# Patient Record
Sex: FEMALE | Race: OTHER | HISPANIC OR LATINO
[De-identification: names, ages, dates, MRNs, and addresses within clinical notes are randomized per-mention and may not be internally consistent; named-entity substitution may affect disease eponyms.]

---

## 2017-12-03 ENCOUNTER — TRANSCRIPTION ENCOUNTER (OUTPATIENT)
Age: 22
End: 2017-12-03

## 2018-03-25 ENCOUNTER — EMERGENCY (EMERGENCY)
Facility: HOSPITAL | Age: 23
LOS: 0 days | Discharge: HOME | End: 2018-03-25
Attending: EMERGENCY MEDICINE | Admitting: EMERGENCY MEDICINE
Payer: COMMERCIAL

## 2018-03-25 VITALS
DIASTOLIC BLOOD PRESSURE: 66 MMHG | SYSTOLIC BLOOD PRESSURE: 129 MMHG | RESPIRATION RATE: 16 BRPM | TEMPERATURE: 98 F | OXYGEN SATURATION: 100 % | HEART RATE: 77 BPM

## 2018-03-25 VITALS — WEIGHT: 110.01 LBS

## 2018-03-25 DIAGNOSIS — F10.10 ALCOHOL ABUSE, UNCOMPLICATED: ICD-10-CM

## 2018-03-25 DIAGNOSIS — R51 HEADACHE: ICD-10-CM

## 2018-03-25 DIAGNOSIS — R11.2 NAUSEA WITH VOMITING, UNSPECIFIED: ICD-10-CM

## 2018-03-25 DIAGNOSIS — F17.210 NICOTINE DEPENDENCE, CIGARETTES, UNCOMPLICATED: ICD-10-CM

## 2018-03-25 LAB
APPEARANCE UR: CLEAR — SIGNIFICANT CHANGE UP
BACTERIA # UR AUTO: (no result)
BILIRUB UR-MCNC: NEGATIVE — SIGNIFICANT CHANGE UP
COLOR SPEC: YELLOW — SIGNIFICANT CHANGE UP
DIFF PNL FLD: (no result)
GLUCOSE UR QL: NEGATIVE MG/DL — SIGNIFICANT CHANGE UP
KETONES UR-MCNC: (no result)
LEUKOCYTE ESTERASE UR-ACNC: (no result)
NITRITE UR-MCNC: NEGATIVE — SIGNIFICANT CHANGE UP
PH UR: 6.5 — SIGNIFICANT CHANGE UP (ref 5–8)
PROT UR-MCNC: NEGATIVE MG/DL — SIGNIFICANT CHANGE UP
RBC CASTS # UR COMP ASSIST: (no result) /HPF (ref 0–4)
SP GR SPEC: 1.01 — SIGNIFICANT CHANGE UP (ref 1.01–1.02)
UROBILINOGEN FLD QL: NEGATIVE MG/DL — SIGNIFICANT CHANGE UP
WBC UR QL: SIGNIFICANT CHANGE UP /HPF (ref 0–5)

## 2018-03-25 PROCEDURE — 99284 EMERGENCY DEPT VISIT MOD MDM: CPT

## 2018-03-25 RX ORDER — SODIUM CHLORIDE 9 MG/ML
2000 INJECTION INTRAMUSCULAR; INTRAVENOUS; SUBCUTANEOUS ONCE
Qty: 0 | Refills: 0 | Status: COMPLETED | OUTPATIENT
Start: 2018-03-25 | End: 2018-03-25

## 2018-03-25 RX ORDER — FAMOTIDINE 10 MG/ML
20 INJECTION INTRAVENOUS ONCE
Qty: 0 | Refills: 0 | Status: COMPLETED | OUTPATIENT
Start: 2018-03-25 | End: 2018-03-25

## 2018-03-25 RX ORDER — ONDANSETRON 8 MG/1
8 TABLET, FILM COATED ORAL ONCE
Qty: 0 | Refills: 0 | Status: DISCONTINUED | OUTPATIENT
Start: 2018-03-25 | End: 2018-03-25

## 2018-03-25 RX ORDER — ONDANSETRON 8 MG/1
4 TABLET, FILM COATED ORAL ONCE
Qty: 0 | Refills: 0 | Status: COMPLETED | OUTPATIENT
Start: 2018-03-25 | End: 2018-03-25

## 2018-03-25 RX ADMIN — ONDANSETRON 4 MILLIGRAM(S): 8 TABLET, FILM COATED ORAL at 11:06

## 2018-03-25 RX ADMIN — FAMOTIDINE 20 MILLIGRAM(S): 10 INJECTION INTRAVENOUS at 11:08

## 2018-03-25 RX ADMIN — SODIUM CHLORIDE 2000 MILLILITER(S): 9 INJECTION INTRAMUSCULAR; INTRAVENOUS; SUBCUTANEOUS at 11:05

## 2018-03-25 NOTE — ED STATDOCS - OBJECTIVE STATEMENT
23 y/o Female with no pertinent PMHx presents to ED c/o N/V x 5 hours. +HA. No fever. Pt admits to drinking alcohol and smoking marijuana last night. Pt denies having experienced symptoms before s/p smoking, drinking. No sick contacts.

## 2018-03-25 NOTE — ED STATDOCS - PROGRESS NOTE DETAILS
22 yr. old female PMH: Denied presents to ED with nausea and vomiting for the past 5 hrs. after drinking and smoking marijuana last night. No fever or chills. Seen and examined by attending in intake. Plan: IV, IVF, Labs and Zofran. Will f/U with results and re evaluate. Jazmyne NP

## 2018-03-25 NOTE — ED STATDOCS - ATTENDING CONTRIBUTION TO CARE
I, Gerardo Tian, performed the initial face to face bedside interview with this patient regarding history of present illness, review of symptoms and relevant past medical, social and family history.  I completed an independent physical examination.  I was the initial provider who evaluated this patient. I have signed out the follow up of any pending tests (i.e. labs, radiological studies) to the ACP.  I have communicated the patient’s plan of care and disposition with the ACP.  The history, relevant review of systems, past medical and surgical history, medical decision making, and physical examination was documented by the scribe in my presence and I attest to the accuracy of the documentation.

## 2018-03-25 NOTE — ED ADULT NURSE NOTE - CHPI ED SYMPTOMS NEG
no burning urination/no fever/no chills/no hematuria/no diarrhea/no blood in stool/no dysuria/no abdominal distension

## 2018-06-27 ENCOUNTER — OUTPATIENT (OUTPATIENT)
Dept: OUTPATIENT SERVICES | Facility: HOSPITAL | Age: 23
LOS: 1 days | Discharge: HOME | End: 2018-06-27

## 2018-06-27 VITALS
HEIGHT: 63 IN | WEIGHT: 106.92 LBS | TEMPERATURE: 98 F | RESPIRATION RATE: 16 BRPM | HEART RATE: 65 BPM | OXYGEN SATURATION: 100 % | DIASTOLIC BLOOD PRESSURE: 64 MMHG | SYSTOLIC BLOOD PRESSURE: 104 MMHG

## 2018-06-27 DIAGNOSIS — Z98.890 OTHER SPECIFIED POSTPROCEDURAL STATES: Chronic | ICD-10-CM

## 2018-06-27 DIAGNOSIS — D30.8: ICD-10-CM

## 2018-06-27 LAB
ABO RH CONFIRMATION: SIGNIFICANT CHANGE UP
ANION GAP SERPL CALC-SCNC: 5 MMOL/L — SIGNIFICANT CHANGE UP (ref 5–17)
APPEARANCE UR: CLEAR — SIGNIFICANT CHANGE UP
BACTERIA # UR AUTO: ABNORMAL
BASOPHILS # BLD AUTO: 0.02 K/UL — SIGNIFICANT CHANGE UP (ref 0–0.2)
BASOPHILS NFR BLD AUTO: 0.3 % — SIGNIFICANT CHANGE UP (ref 0–2)
BILIRUB UR-MCNC: NEGATIVE — SIGNIFICANT CHANGE UP
BLD GP AB SCN SERPL QL: SIGNIFICANT CHANGE UP
BUN SERPL-MCNC: 7 MG/DL — SIGNIFICANT CHANGE UP (ref 7–23)
CALCIUM SERPL-MCNC: 8.7 MG/DL — SIGNIFICANT CHANGE UP (ref 8.5–10.1)
CHLORIDE SERPL-SCNC: 109 MMOL/L — HIGH (ref 96–108)
CO2 SERPL-SCNC: 28 MMOL/L — SIGNIFICANT CHANGE UP (ref 22–31)
COLOR SPEC: YELLOW — SIGNIFICANT CHANGE UP
CREAT SERPL-MCNC: 0.72 MG/DL — SIGNIFICANT CHANGE UP (ref 0.5–1.3)
DIFF PNL FLD: ABNORMAL
EOSINOPHIL # BLD AUTO: 0.21 K/UL — SIGNIFICANT CHANGE UP (ref 0–0.5)
EOSINOPHIL NFR BLD AUTO: 3.6 % — SIGNIFICANT CHANGE UP (ref 0–6)
EPI CELLS # UR: SIGNIFICANT CHANGE UP
GLUCOSE SERPL-MCNC: 72 MG/DL — SIGNIFICANT CHANGE UP (ref 70–99)
GLUCOSE UR QL: NEGATIVE MG/DL — SIGNIFICANT CHANGE UP
HCT VFR BLD CALC: 35.2 % — SIGNIFICANT CHANGE UP (ref 34.5–45)
HGB BLD-MCNC: 12.2 G/DL — SIGNIFICANT CHANGE UP (ref 11.5–15.5)
IMM GRANULOCYTES NFR BLD AUTO: 0.2 % — SIGNIFICANT CHANGE UP (ref 0–1.5)
KETONES UR-MCNC: NEGATIVE — SIGNIFICANT CHANGE UP
LEUKOCYTE ESTERASE UR-ACNC: ABNORMAL
LYMPHOCYTES # BLD AUTO: 2.25 K/UL — SIGNIFICANT CHANGE UP (ref 1–3.3)
LYMPHOCYTES # BLD AUTO: 38.3 % — SIGNIFICANT CHANGE UP (ref 13–44)
MCHC RBC-ENTMCNC: 30.1 PG — SIGNIFICANT CHANGE UP (ref 27–34)
MCHC RBC-ENTMCNC: 34.7 GM/DL — SIGNIFICANT CHANGE UP (ref 32–36)
MCV RBC AUTO: 86.9 FL — SIGNIFICANT CHANGE UP (ref 80–100)
MONOCYTES # BLD AUTO: 0.35 K/UL — SIGNIFICANT CHANGE UP (ref 0–0.9)
MONOCYTES NFR BLD AUTO: 6 % — SIGNIFICANT CHANGE UP (ref 2–14)
NEUTROPHILS # BLD AUTO: 3.04 K/UL — SIGNIFICANT CHANGE UP (ref 1.8–7.4)
NEUTROPHILS NFR BLD AUTO: 51.6 % — SIGNIFICANT CHANGE UP (ref 43–77)
NITRITE UR-MCNC: NEGATIVE — SIGNIFICANT CHANGE UP
NRBC # BLD: 0 /100 WBCS — SIGNIFICANT CHANGE UP (ref 0–0)
PH UR: 7 — SIGNIFICANT CHANGE UP (ref 5–8)
PLATELET # BLD AUTO: 249 K/UL — SIGNIFICANT CHANGE UP (ref 150–400)
POTASSIUM SERPL-MCNC: 4.3 MMOL/L — SIGNIFICANT CHANGE UP (ref 3.5–5.3)
POTASSIUM SERPL-SCNC: 4.3 MMOL/L — SIGNIFICANT CHANGE UP (ref 3.5–5.3)
PROT UR-MCNC: NEGATIVE MG/DL — SIGNIFICANT CHANGE UP
RBC # BLD: 4.05 M/UL — SIGNIFICANT CHANGE UP (ref 3.8–5.2)
RBC # FLD: 11.9 % — SIGNIFICANT CHANGE UP (ref 10.3–14.5)
RBC CASTS # UR COMP ASSIST: SIGNIFICANT CHANGE UP /HPF (ref 0–4)
SODIUM SERPL-SCNC: 142 MMOL/L — SIGNIFICANT CHANGE UP (ref 135–145)
SP GR SPEC: 1.01 — SIGNIFICANT CHANGE UP (ref 1.01–1.02)
TYPE + AB SCN PNL BLD: SIGNIFICANT CHANGE UP
UROBILINOGEN FLD QL: NEGATIVE MG/DL — SIGNIFICANT CHANGE UP
WBC # BLD: 5.88 K/UL — SIGNIFICANT CHANGE UP (ref 3.8–10.5)
WBC # FLD AUTO: 5.88 K/UL — SIGNIFICANT CHANGE UP (ref 3.8–10.5)
WBC UR QL: SIGNIFICANT CHANGE UP

## 2018-06-27 NOTE — H&P PST ADULT - PMH
[place]'s gland cyst Anxiety and depression    Headache    HPV in female  History of .  [place]'s gland cyst

## 2018-06-27 NOTE — H&P PST ADULT - NSANTHOSAYNRD_GEN_A_CORE
No. HUBER screening performed.  STOP BANG Legend: 0-2 = LOW Risk; 3-4 = INTERMEDIATE Risk; 5-8 = HIGH Risk

## 2018-06-27 NOTE — H&P PST ADULT - HISTORY OF PRESENT ILLNESS
22 years old female with [place]'s Gland Cyst. Planned excision. 22 years old female with [place]'s Gland Cyst. She admits to several cysts periurethral. First one in 2015 and another in 2016. They were drained at gynecologist. Present cyst since 2016. "I feel it is in a different spot." Admits to swelling prior to menses. "It is now uncomfortable." Planned excision.

## 2018-06-27 NOTE — H&P PST ADULT - ASSESSMENT
22 years old female present to PST prior to [place]'s Gland Cyst excision with Dr. Briceno.  Plan   1. NPO after midnight  2. Take the following medications with sips of water on the day of procedure:  3. Use E-Z sponge as directed  4. Use Mupirocin as directed.  5. Drink a quart of extra  fluids the day before your surgery.  6 Medical clearance with  7. CBC, BMP, CMP, PT/PTT/INR, Urinalysis, Type and Screen, MRSA sent to lab  8. EKG and CXR done 22 years old female present to PST prior to [place]'s Gland Cyst excision with Dr. Briceno.  Plan   1. NPO after midnight  2. Drink a quart of extra  fluids the day before your surgery.  3. CBC, Urinalysis, Type and Screen sent to lab

## 2018-07-05 ENCOUNTER — OUTPATIENT (OUTPATIENT)
Dept: OUTPATIENT SERVICES | Facility: HOSPITAL | Age: 23
LOS: 1 days | Discharge: HOME | End: 2018-07-05
Payer: COMMERCIAL

## 2018-07-05 ENCOUNTER — RESULT REVIEW (OUTPATIENT)
Age: 23
End: 2018-07-05

## 2018-07-05 VITALS
OXYGEN SATURATION: 100 % | HEART RATE: 53 BPM | SYSTOLIC BLOOD PRESSURE: 118 MMHG | DIASTOLIC BLOOD PRESSURE: 53 MMHG | TEMPERATURE: 98 F | RESPIRATION RATE: 15 BRPM

## 2018-07-05 VITALS
RESPIRATION RATE: 16 BRPM | HEART RATE: 68 BPM | OXYGEN SATURATION: 100 % | WEIGHT: 106.92 LBS | HEIGHT: 62 IN | SYSTOLIC BLOOD PRESSURE: 110 MMHG | TEMPERATURE: 98 F | DIASTOLIC BLOOD PRESSURE: 66 MMHG

## 2018-07-05 DIAGNOSIS — Z98.890 OTHER SPECIFIED POSTPROCEDURAL STATES: Chronic | ICD-10-CM

## 2018-07-05 LAB
BLD GP AB SCN SERPL QL: SIGNIFICANT CHANGE UP
HCG UR QL: NEGATIVE — SIGNIFICANT CHANGE UP
TYPE + AB SCN PNL BLD: SIGNIFICANT CHANGE UP

## 2018-07-05 PROCEDURE — 88304 TISSUE EXAM BY PATHOLOGIST: CPT | Mod: 26

## 2018-07-05 RX ORDER — HYDROMORPHONE HYDROCHLORIDE 2 MG/ML
1 INJECTION INTRAMUSCULAR; INTRAVENOUS; SUBCUTANEOUS EVERY 4 HOURS
Qty: 0 | Refills: 0 | Status: DISCONTINUED | OUTPATIENT
Start: 2018-07-05 | End: 2018-07-05

## 2018-07-05 RX ORDER — OXYCODONE AND ACETAMINOPHEN 5; 325 MG/1; MG/1
2 TABLET ORAL EVERY 6 HOURS
Qty: 0 | Refills: 0 | Status: DISCONTINUED | OUTPATIENT
Start: 2018-07-05 | End: 2018-07-05

## 2018-07-05 RX ORDER — OXYCODONE AND ACETAMINOPHEN 5; 325 MG/1; MG/1
1 TABLET ORAL EVERY 4 HOURS
Qty: 0 | Refills: 0 | Status: DISCONTINUED | OUTPATIENT
Start: 2018-07-05 | End: 2018-07-05

## 2018-07-05 RX ORDER — OXYCODONE HYDROCHLORIDE 5 MG/1
10 TABLET ORAL EVERY 6 HOURS
Qty: 0 | Refills: 0 | Status: DISCONTINUED | OUTPATIENT
Start: 2018-07-05 | End: 2018-07-05

## 2018-07-05 RX ORDER — IBUPROFEN 200 MG
400 TABLET ORAL EVERY 8 HOURS
Qty: 0 | Refills: 0 | Status: DISCONTINUED | OUTPATIENT
Start: 2018-07-05 | End: 2018-07-20

## 2018-07-05 RX ORDER — FENTANYL CITRATE 50 UG/ML
50 INJECTION INTRAVENOUS
Qty: 0 | Refills: 0 | Status: DISCONTINUED | OUTPATIENT
Start: 2018-07-05 | End: 2018-07-05

## 2018-07-05 RX ORDER — DROSPIRENONE AND ETHINYL ESTRADIOL 0.03MG-3MG
1 KIT ORAL
Qty: 0 | Refills: 0 | COMMUNITY

## 2018-07-05 RX ORDER — ONDANSETRON 8 MG/1
4 TABLET, FILM COATED ORAL EVERY 6 HOURS
Qty: 0 | Refills: 0 | Status: DISCONTINUED | OUTPATIENT
Start: 2018-07-05 | End: 2018-07-20

## 2018-07-05 RX ORDER — CHOLECALCIFEROL (VITAMIN D3) 125 MCG
1 CAPSULE ORAL
Qty: 0 | Refills: 0 | COMMUNITY

## 2018-07-05 RX ORDER — SODIUM CHLORIDE 9 MG/ML
1000 INJECTION INTRAMUSCULAR; INTRAVENOUS; SUBCUTANEOUS
Qty: 0 | Refills: 0 | Status: DISCONTINUED | OUTPATIENT
Start: 2018-07-05 | End: 2018-07-05

## 2018-07-05 RX ADMIN — FENTANYL CITRATE 50 MICROGRAM(S): 50 INJECTION INTRAVENOUS at 12:31

## 2018-07-05 RX ADMIN — FENTANYL CITRATE 50 MICROGRAM(S): 50 INJECTION INTRAVENOUS at 12:36

## 2018-07-05 RX ADMIN — SODIUM CHLORIDE 75 MILLILITER(S): 9 INJECTION INTRAMUSCULAR; INTRAVENOUS; SUBCUTANEOUS at 12:33

## 2018-07-05 RX ADMIN — OXYCODONE AND ACETAMINOPHEN 1 TABLET(S): 5; 325 TABLET ORAL at 13:43

## 2018-07-05 RX ADMIN — ONDANSETRON 4 MILLIGRAM(S): 8 TABLET, FILM COATED ORAL at 13:42

## 2018-07-05 NOTE — BRIEF OPERATIVE NOTE - PROCEDURE
<<-----Click on this checkbox to enter Procedure Exam under anesthesia, gynecologic  07/05/2018    Active  ROBERT  Excision of [place]'s gland cyst  07/05/2018    Active  ROBERT Exam under anesthesia, gynecologic  07/05/2018    Active  José Luis Briceno  Excision of [place]'s gland cyst  07/05/2018    Active  José Luis Briceno

## 2018-07-05 NOTE — ASU DISCHARGE PLAN (ADULT/PEDIATRIC). - NOTIFY
Persistent Nausea and Vomiting/Fever greater than 101/Unable to Urinate/Pain not relieved by Medications/heavy bleeding

## 2018-07-05 NOTE — BRIEF OPERATIVE NOTE - POST-OP DX
[place]'s gland cyst  07/05/2018    Active  José Luis Briceno [place]'s gland cyst  07/05/2018    Active  José Luis Briceno

## 2018-07-07 ENCOUNTER — EMERGENCY (EMERGENCY)
Facility: HOSPITAL | Age: 23
LOS: 0 days | Discharge: HOME | End: 2018-07-07
Attending: EMERGENCY MEDICINE | Admitting: EMERGENCY MEDICINE
Payer: COMMERCIAL

## 2018-07-07 VITALS
RESPIRATION RATE: 17 BRPM | TEMPERATURE: 98 F | SYSTOLIC BLOOD PRESSURE: 110 MMHG | OXYGEN SATURATION: 100 % | HEART RATE: 57 BPM | DIASTOLIC BLOOD PRESSURE: 78 MMHG

## 2018-07-07 VITALS
OXYGEN SATURATION: 100 % | DIASTOLIC BLOOD PRESSURE: 60 MMHG | HEART RATE: 54 BPM | SYSTOLIC BLOOD PRESSURE: 101 MMHG | HEIGHT: 62 IN | TEMPERATURE: 98 F | WEIGHT: 106.92 LBS | RESPIRATION RATE: 16 BRPM

## 2018-07-07 DIAGNOSIS — R55 SYNCOPE AND COLLAPSE: ICD-10-CM

## 2018-07-07 DIAGNOSIS — Z88.0 ALLERGY STATUS TO PENICILLIN: ICD-10-CM

## 2018-07-07 DIAGNOSIS — Z98.890 OTHER SPECIFIED POSTPROCEDURAL STATES: Chronic | ICD-10-CM

## 2018-07-07 LAB
ALBUMIN SERPL ELPH-MCNC: 3.5 G/DL — SIGNIFICANT CHANGE UP (ref 3.3–5)
ALP SERPL-CCNC: 56 U/L — SIGNIFICANT CHANGE UP (ref 40–120)
ALT FLD-CCNC: 14 U/L — SIGNIFICANT CHANGE UP (ref 12–78)
ANION GAP SERPL CALC-SCNC: 7 MMOL/L — SIGNIFICANT CHANGE UP (ref 5–17)
APTT BLD: 24.6 SEC — LOW (ref 27.5–37.4)
AST SERPL-CCNC: 12 U/L — LOW (ref 15–37)
BASOPHILS # BLD AUTO: 0.04 K/UL — SIGNIFICANT CHANGE UP (ref 0–0.2)
BASOPHILS NFR BLD AUTO: 0.4 % — SIGNIFICANT CHANGE UP (ref 0–2)
BILIRUB SERPL-MCNC: 0.2 MG/DL — SIGNIFICANT CHANGE UP (ref 0.2–1.2)
BUN SERPL-MCNC: 4 MG/DL — LOW (ref 7–23)
CALCIUM SERPL-MCNC: 7.8 MG/DL — LOW (ref 8.5–10.1)
CHLORIDE SERPL-SCNC: 109 MMOL/L — HIGH (ref 96–108)
CO2 SERPL-SCNC: 26 MMOL/L — SIGNIFICANT CHANGE UP (ref 22–31)
CREAT SERPL-MCNC: 0.79 MG/DL — SIGNIFICANT CHANGE UP (ref 0.5–1.3)
D DIMER BLD IA.RAPID-MCNC: 201 NG/ML DDU — SIGNIFICANT CHANGE UP
EOSINOPHIL # BLD AUTO: 0.37 K/UL — SIGNIFICANT CHANGE UP (ref 0–0.5)
EOSINOPHIL NFR BLD AUTO: 3.8 % — SIGNIFICANT CHANGE UP (ref 0–6)
GLUCOSE SERPL-MCNC: 110 MG/DL — HIGH (ref 70–99)
HCT VFR BLD CALC: 32 % — LOW (ref 34.5–45)
HGB BLD-MCNC: 10.8 G/DL — LOW (ref 11.5–15.5)
IMM GRANULOCYTES NFR BLD AUTO: 0.2 % — SIGNIFICANT CHANGE UP (ref 0–1.5)
INR BLD: 1.1 RATIO — SIGNIFICANT CHANGE UP (ref 0.88–1.16)
LYMPHOCYTES # BLD AUTO: 3.43 K/UL — HIGH (ref 1–3.3)
LYMPHOCYTES # BLD AUTO: 35.1 % — SIGNIFICANT CHANGE UP (ref 13–44)
MCHC RBC-ENTMCNC: 29.3 PG — SIGNIFICANT CHANGE UP (ref 27–34)
MCHC RBC-ENTMCNC: 33.8 GM/DL — SIGNIFICANT CHANGE UP (ref 32–36)
MCV RBC AUTO: 87 FL — SIGNIFICANT CHANGE UP (ref 80–100)
MONOCYTES # BLD AUTO: 0.55 K/UL — SIGNIFICANT CHANGE UP (ref 0–0.9)
MONOCYTES NFR BLD AUTO: 5.6 % — SIGNIFICANT CHANGE UP (ref 2–14)
NEUTROPHILS # BLD AUTO: 5.36 K/UL — SIGNIFICANT CHANGE UP (ref 1.8–7.4)
NEUTROPHILS NFR BLD AUTO: 54.9 % — SIGNIFICANT CHANGE UP (ref 43–77)
NRBC # BLD: 0 /100 WBCS — SIGNIFICANT CHANGE UP (ref 0–0)
PLATELET # BLD AUTO: 198 K/UL — SIGNIFICANT CHANGE UP (ref 150–400)
POTASSIUM SERPL-MCNC: 3.5 MMOL/L — SIGNIFICANT CHANGE UP (ref 3.5–5.3)
POTASSIUM SERPL-SCNC: 3.5 MMOL/L — SIGNIFICANT CHANGE UP (ref 3.5–5.3)
PROT SERPL-MCNC: 6.4 GM/DL — SIGNIFICANT CHANGE UP (ref 6–8.3)
PROTHROM AB SERPL-ACNC: 11.9 SEC — SIGNIFICANT CHANGE UP (ref 9.8–12.7)
RBC # BLD: 3.68 M/UL — LOW (ref 3.8–5.2)
RBC # FLD: 12.2 % — SIGNIFICANT CHANGE UP (ref 10.3–14.5)
SODIUM SERPL-SCNC: 142 MMOL/L — SIGNIFICANT CHANGE UP (ref 135–145)
TROPONIN I SERPL-MCNC: <0.015 NG/ML — SIGNIFICANT CHANGE UP (ref 0.01–0.04)
TROPONIN I SERPL-MCNC: <0.015 NG/ML — SIGNIFICANT CHANGE UP (ref 0.01–0.04)
WBC # BLD: 9.77 K/UL — SIGNIFICANT CHANGE UP (ref 3.8–10.5)
WBC # FLD AUTO: 9.77 K/UL — SIGNIFICANT CHANGE UP (ref 3.8–10.5)

## 2018-07-07 PROCEDURE — 93010 ELECTROCARDIOGRAM REPORT: CPT

## 2018-07-07 PROCEDURE — 99284 EMERGENCY DEPT VISIT MOD MDM: CPT | Mod: 25

## 2018-07-07 PROCEDURE — 71046 X-RAY EXAM CHEST 2 VIEWS: CPT | Mod: 26

## 2018-07-07 RX ORDER — SODIUM CHLORIDE 9 MG/ML
1000 INJECTION INTRAMUSCULAR; INTRAVENOUS; SUBCUTANEOUS ONCE
Qty: 0 | Refills: 0 | Status: COMPLETED | OUTPATIENT
Start: 2018-07-07 | End: 2018-07-07

## 2018-07-07 RX ORDER — ACETAMINOPHEN 500 MG
650 TABLET ORAL ONCE
Qty: 0 | Refills: 0 | Status: COMPLETED | OUTPATIENT
Start: 2018-07-07 | End: 2018-07-07

## 2018-07-07 RX ORDER — SODIUM CHLORIDE 9 MG/ML
3 INJECTION INTRAMUSCULAR; INTRAVENOUS; SUBCUTANEOUS ONCE
Qty: 0 | Refills: 0 | Status: COMPLETED | OUTPATIENT
Start: 2018-07-07 | End: 2018-07-07

## 2018-07-07 RX ADMIN — SODIUM CHLORIDE 1000 MILLILITER(S): 9 INJECTION INTRAMUSCULAR; INTRAVENOUS; SUBCUTANEOUS at 03:43

## 2018-07-07 RX ADMIN — Medication 650 MILLIGRAM(S): at 03:43

## 2018-07-07 RX ADMIN — SODIUM CHLORIDE 3 MILLILITER(S): 9 INJECTION INTRAMUSCULAR; INTRAVENOUS; SUBCUTANEOUS at 03:18

## 2018-07-07 NOTE — ED PROVIDER NOTE - MUSCULOSKELETAL, MLM
Spine appears normal, range of motion is not limited, no muscle or joint tenderness. 5/5 strength on flexion and extension of all limbs. No nuchal rigidity.

## 2018-07-07 NOTE — ED PROVIDER NOTE - MEDICAL DECISION MAKING DETAILS
Hillary MELENDEZ: Unremarkable blood work, imaging, cardiology to see outpatient, instructions provided to return if any worsening of symptoms.

## 2018-07-07 NOTE — ED ADULT NURSE NOTE - OBJECTIVE STATEMENT
Pt states had cyst removal by Dr Briceno yesterday, presents after syncopal episode in bathroom today.  Pt states "I felt dizzy and nauseas then I blacked out".  Mother states she caught pt before falling onto ground.  Pt states "I have passed out before".  Vitals as noted, pt alert and awake at this time,.

## 2018-07-07 NOTE — ED PROVIDER NOTE - OBJECTIVE STATEMENT
22 year old female with no significant PMH other than recent removal of vaginal cyst, presents with cc of having had a syncope episode in the bathroom witnessed by mom. No fever or chills. No melena or hematochezia. No dysuria hematuria or frequency. No recent exotic travel. No recent trauama. 22 year old female with no significant PMH other than recent removal of vaginal cyst followed up by own gyn, presents with cc of having had a syncopal episode in the bathroom witnessed by mom. No fever or chills. No melena or hematochezia. No dysuria hematuria or frequency. No recent exotic travel. No recent trauma. No cp, sob or palpitation before, or after the event. No seizure like activity. No tongue bites or pain. No neck stiffness or rash. No post ictal stage.

## 2018-07-07 NOTE — ED PROVIDER NOTE - NEUROLOGICAL, MLM
Alert and oriented, no focal deficits, no motor or sensory deficits. CNs 2-12 intact. Intact sensation and proprioception. NIH=0 GCS=15 No dysarthria or aphasia. FTN intact.

## 2018-07-07 NOTE — ED PROVIDER NOTE - PROGRESS NOTE DETAILS
Spoke with Dr. Pollock who states patient is safe to go home, given two negative troponin. Discussed bradycardia (mid to high 40s, and low 50s rate) and cardiology states she can go outpatient and follow up with cards.

## 2018-07-11 DIAGNOSIS — N36.8 OTHER SPECIFIED DISORDERS OF URETHRA: ICD-10-CM

## 2018-07-11 DIAGNOSIS — F32.9 MAJOR DEPRESSIVE DISORDER, SINGLE EPISODE, UNSPECIFIED: ICD-10-CM

## 2018-07-11 DIAGNOSIS — Z88.0 ALLERGY STATUS TO PENICILLIN: ICD-10-CM

## 2018-07-11 DIAGNOSIS — F41.9 ANXIETY DISORDER, UNSPECIFIED: ICD-10-CM

## 2021-12-21 ENCOUNTER — TRANSCRIPTION ENCOUNTER (OUTPATIENT)
Age: 26
End: 2021-12-21

## 2021-12-26 ENCOUNTER — EMERGENCY (EMERGENCY)
Facility: HOSPITAL | Age: 26
LOS: 0 days | Discharge: HOME | End: 2021-12-26
Attending: EMERGENCY MEDICINE
Payer: COMMERCIAL

## 2021-12-26 VITALS — WEIGHT: 119.93 LBS | HEIGHT: 62 IN

## 2021-12-26 VITALS
TEMPERATURE: 99 F | DIASTOLIC BLOOD PRESSURE: 59 MMHG | HEART RATE: 77 BPM | RESPIRATION RATE: 18 BRPM | SYSTOLIC BLOOD PRESSURE: 110 MMHG | OXYGEN SATURATION: 100 %

## 2021-12-26 DIAGNOSIS — R00.2 PALPITATIONS: ICD-10-CM

## 2021-12-26 DIAGNOSIS — R42 DIZZINESS AND GIDDINESS: ICD-10-CM

## 2021-12-26 DIAGNOSIS — Z88.0 ALLERGY STATUS TO PENICILLIN: ICD-10-CM

## 2021-12-26 DIAGNOSIS — Z98.890 OTHER SPECIFIED POSTPROCEDURAL STATES: Chronic | ICD-10-CM

## 2021-12-26 DIAGNOSIS — Z86.79 PERSONAL HISTORY OF OTHER DISEASES OF THE CIRCULATORY SYSTEM: ICD-10-CM

## 2021-12-26 DIAGNOSIS — R55 SYNCOPE AND COLLAPSE: ICD-10-CM

## 2021-12-26 PROBLEM — F41.9 ANXIETY DISORDER, UNSPECIFIED: Chronic | Status: ACTIVE | Noted: 2018-06-27

## 2021-12-26 PROBLEM — R51 HEADACHE: Chronic | Status: ACTIVE | Noted: 2018-06-27

## 2021-12-26 PROBLEM — B97.7 PAPILLOMAVIRUS AS THE CAUSE OF DISEASES CLASSIFIED ELSEWHERE: Chronic | Status: ACTIVE | Noted: 2018-06-27

## 2021-12-26 PROBLEM — N36.8 OTHER SPECIFIED DISORDERS OF URETHRA: Chronic | Status: ACTIVE | Noted: 2018-06-27

## 2021-12-26 LAB
ALBUMIN SERPL ELPH-MCNC: 3.9 G/DL — SIGNIFICANT CHANGE UP (ref 3.3–5)
ALP SERPL-CCNC: 77 U/L — SIGNIFICANT CHANGE UP (ref 40–120)
ALT FLD-CCNC: 26 U/L — SIGNIFICANT CHANGE UP (ref 12–78)
ANION GAP SERPL CALC-SCNC: 6 MMOL/L — SIGNIFICANT CHANGE UP (ref 5–17)
AST SERPL-CCNC: 14 U/L — LOW (ref 15–37)
BASOPHILS # BLD AUTO: 0.04 K/UL — SIGNIFICANT CHANGE UP (ref 0–0.2)
BASOPHILS NFR BLD AUTO: 0.4 % — SIGNIFICANT CHANGE UP (ref 0–2)
BILIRUB SERPL-MCNC: 0.3 MG/DL — SIGNIFICANT CHANGE UP (ref 0.2–1.2)
BUN SERPL-MCNC: 6 MG/DL — LOW (ref 7–23)
CALCIUM SERPL-MCNC: 8.9 MG/DL — SIGNIFICANT CHANGE UP (ref 8.5–10.1)
CHLORIDE SERPL-SCNC: 111 MMOL/L — HIGH (ref 96–108)
CO2 SERPL-SCNC: 24 MMOL/L — SIGNIFICANT CHANGE UP (ref 22–31)
CREAT SERPL-MCNC: 0.83 MG/DL — SIGNIFICANT CHANGE UP (ref 0.5–1.3)
EOSINOPHIL # BLD AUTO: 0.57 K/UL — HIGH (ref 0–0.5)
EOSINOPHIL NFR BLD AUTO: 6.3 % — HIGH (ref 0–6)
GLUCOSE SERPL-MCNC: 92 MG/DL — SIGNIFICANT CHANGE UP (ref 70–99)
HCG SERPL-ACNC: <1 MIU/ML — SIGNIFICANT CHANGE UP
HCT VFR BLD CALC: 38.6 % — SIGNIFICANT CHANGE UP (ref 34.5–45)
HGB BLD-MCNC: 12.9 G/DL — SIGNIFICANT CHANGE UP (ref 11.5–15.5)
IMM GRANULOCYTES NFR BLD AUTO: 0.2 % — SIGNIFICANT CHANGE UP (ref 0–1.5)
LYMPHOCYTES # BLD AUTO: 3.14 K/UL — SIGNIFICANT CHANGE UP (ref 1–3.3)
LYMPHOCYTES # BLD AUTO: 34.6 % — SIGNIFICANT CHANGE UP (ref 13–44)
MCHC RBC-ENTMCNC: 29.1 PG — SIGNIFICANT CHANGE UP (ref 27–34)
MCHC RBC-ENTMCNC: 33.4 GM/DL — SIGNIFICANT CHANGE UP (ref 32–36)
MCV RBC AUTO: 86.9 FL — SIGNIFICANT CHANGE UP (ref 80–100)
MONOCYTES # BLD AUTO: 0.42 K/UL — SIGNIFICANT CHANGE UP (ref 0–0.9)
MONOCYTES NFR BLD AUTO: 4.6 % — SIGNIFICANT CHANGE UP (ref 2–14)
NEUTROPHILS # BLD AUTO: 4.88 K/UL — SIGNIFICANT CHANGE UP (ref 1.8–7.4)
NEUTROPHILS NFR BLD AUTO: 53.9 % — SIGNIFICANT CHANGE UP (ref 43–77)
PLATELET # BLD AUTO: 292 K/UL — SIGNIFICANT CHANGE UP (ref 150–400)
POTASSIUM SERPL-MCNC: 3.9 MMOL/L — SIGNIFICANT CHANGE UP (ref 3.5–5.3)
POTASSIUM SERPL-SCNC: 3.9 MMOL/L — SIGNIFICANT CHANGE UP (ref 3.5–5.3)
PROT SERPL-MCNC: 7.2 GM/DL — SIGNIFICANT CHANGE UP (ref 6–8.3)
RBC # BLD: 4.44 M/UL — SIGNIFICANT CHANGE UP (ref 3.8–5.2)
RBC # FLD: 12.4 % — SIGNIFICANT CHANGE UP (ref 10.3–14.5)
SODIUM SERPL-SCNC: 141 MMOL/L — SIGNIFICANT CHANGE UP (ref 135–145)
WBC # BLD: 9.07 K/UL — SIGNIFICANT CHANGE UP (ref 3.8–10.5)
WBC # FLD AUTO: 9.07 K/UL — SIGNIFICANT CHANGE UP (ref 3.8–10.5)

## 2021-12-26 PROCEDURE — 93005 ELECTROCARDIOGRAM TRACING: CPT

## 2021-12-26 PROCEDURE — 99284 EMERGENCY DEPT VISIT MOD MDM: CPT | Mod: 25

## 2021-12-26 PROCEDURE — 70450 CT HEAD/BRAIN W/O DYE: CPT | Mod: MA

## 2021-12-26 PROCEDURE — 84702 CHORIONIC GONADOTROPIN TEST: CPT

## 2021-12-26 PROCEDURE — 70450 CT HEAD/BRAIN W/O DYE: CPT | Mod: 26,MA

## 2021-12-26 PROCEDURE — 93010 ELECTROCARDIOGRAM REPORT: CPT

## 2021-12-26 PROCEDURE — 99285 EMERGENCY DEPT VISIT HI MDM: CPT

## 2021-12-26 PROCEDURE — 80053 COMPREHEN METABOLIC PANEL: CPT

## 2021-12-26 PROCEDURE — 36415 COLL VENOUS BLD VENIPUNCTURE: CPT

## 2021-12-26 PROCEDURE — 85025 COMPLETE CBC W/AUTO DIFF WBC: CPT

## 2021-12-26 RX ORDER — SODIUM CHLORIDE 9 MG/ML
1000 INJECTION INTRAMUSCULAR; INTRAVENOUS; SUBCUTANEOUS ONCE
Refills: 0 | Status: COMPLETED | OUTPATIENT
Start: 2021-12-26 | End: 2021-12-26

## 2021-12-26 RX ADMIN — SODIUM CHLORIDE 1000 MILLILITER(S): 9 INJECTION INTRAMUSCULAR; INTRAVENOUS; SUBCUTANEOUS at 00:59

## 2021-12-26 NOTE — ED PROVIDER NOTE - PATIENT PORTAL LINK FT
You can access the FollowMyHealth Patient Portal offered by [facility] by registering at the following website: [email or web address]. By joining [employer]’s FollowMyHealth portal, you will also be able to view your health information using other applications (apps) compatible with our system.

## 2021-12-26 NOTE — ED PROVIDER NOTE - NSICDXPASTMEDICALHX_GEN_ALL_CORE_FT
PAST MEDICAL HISTORY:  Anxiety and depression     H/O syncope     Headache     HPV in female History of .    [place]'s gland cyst

## 2021-12-26 NOTE — ED ADULT TRIAGE NOTE - CHIEF COMPLAINT QUOTE
Pt. presents to ED s/p syncopal episode. Pt. states she was home about to serve dinner when she felt faint and her heart began racing. Pt. states she remembers sitting down then was told she fainted and fell off the chair hitting her head. Pt. currently awake and alert in triage. Pt. states her family thinks she may have also had seizure.

## 2021-12-26 NOTE — ED PROVIDER NOTE - OBJECTIVE STATEMENT
27 yo female pw syncopal episode. pt states she ahs syncopized in the past, she ususally feels nauseaous then weak. tonight she had palpitations, got dizzy sat in chair and syncopized striking head. Pt is not post ictal,. She states she got up too fast

## 2021-12-26 NOTE — ED PROVIDER NOTE - PHYSICAL EXAMINATION
Gen:  Well appearing in NAD  Head:  NC/AT  HEENT: pupils perrl,no pharyngeal erythema, uvula midline  Cardiac: S1S2, RRR  Abd: Soft, non tender  Resp: No distress, CTA   musculoskeletal:: no deformities, no swelling, strength +5/+5  Skin: warm and dry as visualized, no rashes  Neuro: GOMEZ, cn2-12, aao x 4  Psych:alert, cooperative, appropriate mood and affect for situation

## 2021-12-26 NOTE — ED PROVIDER NOTE - CARE PROVIDER_API CALL
Phong Myers)  Cardiovascular Disease; Internal Medicine  [address] [place] [address]  Phone: [phone]  Fax: [phone]  Follow Up Time:

## 2021-12-26 NOTE — ED PROVIDER NOTE - NSFOLLOWUPINSTRUCTIONS_ED_ALL_ED_FT
Syncope    WHAT YOU NEED TO KNOW: do not drive or take a bath while dizzy, no hot tubs    Syncope is also called fainting or passing out. Syncope is a sudden, temporary loss of consciousness, followed by a fall from a standing or sitting position. Syncope ranges from not serious to a sign of a more serious condition that needs to be treated. You can control some health conditions that cause syncope. Your healthcare providers can help you create a plan to manage syncope and prevent episodes.    DISCHARGE INSTRUCTIONS:    Seek care immediately if:   •You are bleeding because you hit your head when you fainted.       •You suddenly have double vision, difficulty speaking, numbness, and cannot move your arms or legs.      •You have chest pain and trouble breathing.      •You vomit blood or material that looks like coffee grounds.      •You see blood in your bowel movement.      Contact your healthcare provider if:   •You have new or worsening symptoms.      •You have another syncope episode.      •You have a headache, fast heartbeat, or feel too dizzy to stand up.      •You have questions or concerns about your condition or care.      Medicines:   •Medicines may be needed to help your heart pump strongly and regularly. Your healthcare provider may also make changes to any medicines that are causing syncope.       •Take your medicine as directed. Contact your healthcare provider if you think your medicine is not helping or if you have side effects. Tell him or her if you are allergic to any medicine. Keep a list of the medicines, vitamins, and herbs you take. Include the amounts, and when and why you take them. Bring the list or the pill bottles to follow-up visits. Carry your medicine list with you in case of an emergency.      Follow up with your doctor as directed: Write down your questions so you remember to ask them during your visits.     Manage syncope:   •Keep a record of your syncope episodes. Include your symptoms and your activity before and after the episode. The record can help your healthcare provider find the cause of your syncope and help you manage episodes.      •Sit or lie down when needed. This includes when you feel dizzy, your throat is getting tight, and your vision changes. Raise your legs above the level of your heart.      •Take slow, deep breaths if you start to breathe faster with anxiety or fear. This can help decrease dizziness and the feeling that you might faint.       •Check your blood pressure often. This is important if you take medicine to lower your blood pressure. Check your blood pressure when you are lying down and when you are standing. Ask how often to check during the day. Keep a record of your blood pressure numbers. Your healthcare provider may use the record to help plan your treatment.  How to take a Blood Pressure           Prevent a syncope episode:   •Move slowly and let yourself get used to one position before you move to another position. This is very important when you change from a lying or sitting position to a standing position. Take some deep breaths before you stand up from a lying position. Stand up slowly. Sudden movements may cause a fainting spell. Sit on the side of the bed or couch for a few minutes before you stand up. If you are on bedrest, try to be upright for about 2 hours each day, or as directed. Do not lock your legs if you are standing for a long period of time. Move your legs and bend your knees to keep blood flowing.      •Follow your healthcare provider's recommendations. Your provider may recommend that you drink more liquids to prevent dehydration. You may also need to have more salt to keep your blood pressure from dropping too low and causing syncope. Your provider will tell you how much liquid and sodium to have each day. He or she will also tell you how much physical activity is safe for you. This will depend on what is causing your syncope.      •Watch for signs of low blood sugar. These include hunger, nervousness, sweating, and fast or fluttery heartbeats. Talk with your healthcare provider about ways to keep your blood sugar level steady.      •Do not strain if you are constipated. You may faint if you strain to have a bowel movement. Walking is the best way to get your bowels moving. Eat foods high in fiber to make it easier to have a bowel movement. Good examples are high-fiber cereals, beans, vegetables, and whole-grain breads. Prune juice may help make bowel movements softer.      •Be careful in hot weather. Heat can cause a syncope episode. Limit activity done outside on hot days. Physical activity in hot weather can lead to dehydration. This can cause an episode.         © Copyright [employer] 2021           back to top                          © Copyright [employer] 2021

## 2021-12-28 PROBLEM — Z87.898 PERSONAL HISTORY OF OTHER SPECIFIED CONDITIONS: Chronic | Status: ACTIVE | Noted: 2021-12-26

## 2022-01-04 PROBLEM — Z00.00 ENCOUNTER FOR PREVENTIVE HEALTH EXAMINATION: Status: ACTIVE | Noted: 2022-01-04

## 2022-01-12 ENCOUNTER — APPOINTMENT (OUTPATIENT)
Dept: CARDIOLOGY | Facility: CLINIC | Age: 27
End: 2022-01-12

## 2023-02-06 ENCOUNTER — EMERGENCY (EMERGENCY)
Facility: HOSPITAL | Age: 28
LOS: 0 days | Discharge: HOME | End: 2023-02-06
Attending: EMERGENCY MEDICINE
Payer: COMMERCIAL

## 2023-02-06 VITALS — HEIGHT: 62 IN | WEIGHT: 145.95 LBS

## 2023-02-06 VITALS
HEART RATE: 100 BPM | TEMPERATURE: 99 F | OXYGEN SATURATION: 98 % | RESPIRATION RATE: 20 BRPM | SYSTOLIC BLOOD PRESSURE: 128 MMHG | DIASTOLIC BLOOD PRESSURE: 79 MMHG

## 2023-02-06 DIAGNOSIS — J45.901 UNSPECIFIED ASTHMA WITH (ACUTE) EXACERBATION: ICD-10-CM
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
## 2023-02-06 DIAGNOSIS — R06.2 WHEEZING: ICD-10-CM

## 2023-02-06 DIAGNOSIS — R06.02 SHORTNESS OF BREATH: ICD-10-CM

## 2023-02-06 DIAGNOSIS — Z98.890 OTHER SPECIFIED POSTPROCEDURAL STATES: Chronic | ICD-10-CM

## 2023-02-06 DIAGNOSIS — Z88.0 ALLERGY STATUS TO PENICILLIN: ICD-10-CM

## 2023-02-06 DIAGNOSIS — T41.0X6A: ICD-10-CM
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
## 2023-02-06 DIAGNOSIS — F41.8 OTHER SPECIFIED ANXIETY DISORDERS: ICD-10-CM

## 2023-02-06 PROCEDURE — 99284 EMERGENCY DEPT VISIT MOD MDM: CPT | Mod: 25

## 2023-02-06 PROCEDURE — 94640 AIRWAY INHALATION TREATMENT: CPT

## 2023-02-06 PROCEDURE — 99284 EMERGENCY DEPT VISIT MOD MDM: CPT

## 2023-02-06 RX ORDER — IPRATROPIUM/ALBUTEROL SULFATE 18-103MCG
3 AEROSOL WITH ADAPTER (GRAM) INHALATION
Refills: 0 | Status: COMPLETED | OUTPATIENT
Start: 2023-02-06 | End: 2023-02-06

## 2023-02-06 RX ORDER — ALBUTEROL 90 UG/1
2 AEROSOL, METERED ORAL
Qty: 1 | Refills: 0
Start: 2023-02-06

## 2023-02-06 RX ADMIN — Medication 3 MILLILITER(S): at 02:41

## 2023-02-06 RX ADMIN — Medication 3 MILLILITER(S): at 03:00

## 2023-02-06 RX ADMIN — Medication 3 MILLILITER(S): at 03:05

## 2023-02-06 RX ADMIN — Medication 60 MILLIGRAM(S): at 02:41

## 2023-02-06 NOTE — ED PROVIDER NOTE - OBJECTIVE STATEMENT
Pt. is a 27 year old F with PMHx of asthma diagnosed about 2.5 years ago on albuterol inhaler only , now breastfeeding new infant at home presents with chest tightness, wheezing and shortness of breath X 1 hour.  Patient states she currently lives in [place] but was staying with at her mom's house last night locally who has dogs that usually trigger her asthma.  Patient ran out of her inhaler.  Feels like she is getting an asthma exacerbation and thinks she will feel better with treatment.  During the day patient felt fine.  She denies fever, chills, nausea, vomiting or illness.

## 2023-02-06 NOTE — ED PROVIDER NOTE - PATIENT PORTAL LINK FT
You can access the FollowMyHealth Patient Portal offered by [facility] by registering at the following website: [email or web address]. By joining [employer]’s FollowMyHealth portal, you will also be able to view your health information using other applications (apps) compatible with our system.

## 2023-02-06 NOTE — ED PROVIDER NOTE - NSICDXPASTMEDICALHX_GEN_ALL_CORE_FT
PAST MEDICAL HISTORY:  Anxiety and depression     Asthma     H/O syncope     Headache     HPV in female History of .    [place]'s gland cyst

## 2023-02-06 NOTE — ED ADULT TRIAGE NOTE - CHIEF COMPLAINT QUOTE
Pt coming from home c/o ashtma exacerbation x30 mins. Pt states "I usually take my inhaler but I don't have anymore". Pt says she has a pulmonologist appt tm to figure out why she is having increased asthma attacks. In triage HR 103bpm, O2 94% room air. Allergy to penicillin. Pt has visible increased work of breathing.

## 2023-02-06 NOTE — ED PROVIDER NOTE - PROGRESS NOTE DETAILS
JG:  Patient feeling much better.  Exam normal without any wheezing.  Patient will take claritin or zyrtec when around her moms house or the dogs.  She will also  inhaler and prednisone at pharmacy.

## 2023-02-06 NOTE — ED ADULT NURSE NOTE - NSICDXPASTMEDICALHX_GEN_ALL_CORE_FT
PAST MEDICAL HISTORY:  Anxiety and depression     Asthma     H/O syncope     Headache     HPV in female History of .    [place]'s gland cyst

## 2023-02-06 NOTE — ED PROVIDER NOTE - NSFOLLOWUPINSTRUCTIONS_ED_ALL_ED_FT
Asthma    WHAT YOU NEED TO KNOW:    Asthma is a lung disease that makes breathing difficult. Chronic inflammation and reactions to triggers narrow the airways in the lungs. Asthma can become life-threatening if it is not managed.          DISCHARGE INSTRUCTIONS:    Call your local emergency number (911 in the [place]) if:     You have severe shortness of breath.       Your lips or nails turn blue or gray.       The skin around your neck and ribs pulls in with each breath.      You have shortness of breath, even after you take your short-term medicine as directed.       Your peak flow numbers are in the red zone of your AAP.     Call your doctor if:     You run out of medicine before your next refill is due.      Your symptoms get worse.       You need to take more medicine than usual to control your symptoms.       You have questions or concerns about your condition or care.    Medicines:     Medicines decrease inflammation, open airways, and make it easier to breathe. Medicines may be inhaled, taken as a pill, or injected. Short-term medicines relieve your symptoms quickly. Long-term medicines are used to prevent future attacks. You may also need medicine to help control your allergies. Ask your healthcare provider for more information about the medicine you are given and how to take it safely.      Take your medicine as directed. Contact your healthcare provider if you think your medicine is not helping or if you have side effects. Tell him of her if you are allergic to any medicine. Keep a list of the medicines, vitamins, and herbs you take. Include the amounts, and when and why you take them. Bring the list or the pill bottles to follow-up visits. Carry your medicine list with you in case of an emergency.    Follow up with your healthcare provider as directed: You will need to return to make sure your medicine is working and your symptoms are controlled. You may be referred to an asthma specialist. You may be asked to keep a record of your peak flow values and bring it with you to your appointments. Write down your questions so you remember to ask them during your visits.    Manage your symptoms and prevent future attacks:     Follow your Asthma Action Plan (AAP). This is a written plan that you and your healthcare provider create. It explains which medicine you need and when to change doses if necessary. It also explains how you can monitor symptoms and use a peak flow meter. The meter measures how well your lungs are working.       Manage other health conditions, such as allergies, acid reflux, and sleep apnea.       Identify and avoid triggers. These may include pets, dust mites, mold, and cockroaches.           Do not smoke or be around others who smoke. Nicotine and other chemicals in cigarettes and cigars can cause lung damage. Ask your healthcare provider for information if you currently smoke and need help to quit. E-cigarettes or smokeless tobacco still contain nicotine. Talk to your healthcare provider before you use these products.       Ask about the flu vaccine. The flu can make your asthma worse. You may need a yearly flu shot. Follow up with your doctor within 1 week.  Take albuterol 2 puffs every 4 hours as needed for wheezing.  Take claritin or zyrtec daily or when around dogs or moms house.  Take prednisone as prescribed.    Asthma    WHAT YOU NEED TO KNOW:    Asthma is a lung disease that makes breathing difficult. Chronic inflammation and reactions to triggers narrow the airways in the lungs. Asthma can become life-threatening if it is not managed.          DISCHARGE INSTRUCTIONS:    Call your local emergency number (911 in the ) if:     You have severe shortness of breath.       Your lips or nails turn blue or gray.       The skin around your neck and ribs pulls in with each breath.      You have shortness of breath, even after you take your short-term medicine as directed.       Your peak flow numbers are in the red zone of your AAP.     Call your doctor if:     You run out of medicine before your next refill is due.      Your symptoms get worse.       You need to take more medicine than usual to control your symptoms.       You have questions or concerns about your condition or care.    Medicines:     Medicines decrease inflammation, open airways, and make it easier to breathe. Medicines may be inhaled, taken as a pill, or injected. Short-term medicines relieve your symptoms quickly. Long-term medicines are used to prevent future attacks. You may also need medicine to help control your allergies. Ask your healthcare provider for more information about the medicine you are given and how to take it safely.      Take your medicine as directed. Contact your healthcare provider if you think your medicine is not helping or if you have side effects. Tell him of her if you are allergic to any medicine. Keep a list of the medicines, vitamins, and herbs you take. Include the amounts, and when and why you take them. Bring the list or the pill bottles to follow-up visits. Carry your medicine list with you in case of an emergency.    Follow up with your healthcare provider as directed: You will need to return to make sure your medicine is working and your symptoms are controlled. You may be referred to an asthma specialist. You may be asked to keep a record of your peak flow values and bring it with you to your appointments. Write down your questions so you remember to ask them during your visits.    Manage your symptoms and prevent future attacks:     Follow your Asthma Action Plan (AAP). This is a written plan that you and your healthcare provider create. It explains which medicine you need and when to change doses if necessary. It also explains how you can monitor symptoms and use a peak flow meter. The meter measures how well your lungs are working.       Manage other health conditions, such as allergies, acid reflux, and sleep apnea.       Identify and avoid triggers. These may include pets, dust mites, mold, and cockroaches.           Do not smoke or be around others who smoke. Nicotine and other chemicals in cigarettes and cigars can cause lung damage. Ask your healthcare provider for information if you currently smoke and need help to quit. E-cigarettes or smokeless tobacco still contain nicotine. Talk to your healthcare provider before you use these products.       Ask about the flu vaccine. The flu can make your asthma worse. You may need a yearly flu shot.

## 2023-02-06 NOTE — ED ADULT NURSE NOTE - OBJECTIVE STATEMENT
Pt coming from home c/o ashtma exacerbation x30 mins. Pt states "I usually take my inhaler but I don't have anymore". Pt says she has a pulmonologist appt tm to figure out why she is having increased asthma attacks. .

## 2023-05-28 NOTE — H&P PST ADULT - SYMPTOMS
[facility] - Telemetry [facility] Medicine  Progress Note    Patient Name: Enedina Phillips  MRN: [id number]  Patient Class: IP- Inpatient   Admission Date: 5/24/2023  Length of Stay: 4 days  Attending Physician: Nam Lao MD  Primary Care Provider: Whitney Harp MD        Subjective:     Principal Problem:Cholecystitis        HPI:  Patient is a 73-year-old female with past medical history of thyroid disease, hypertension, hyperlipidemia, gout, Hashimoto's, prediabetes, and recently noted extensive liver lesions felt to represent metastatic disease who presents today from IR for nausea and vomiting.  Patient reports she started having nausea and vomiting about a month ago and was seen in the ER where she was diagnosed with a liver mass.  Today she was going to IR for a biopsy of the liver mass but was unable to proceed with nausea and vomiting.  She states that she is been not able to do much at home.  She lives on the bed and she feels very nauseated.  She states she is had pain medicine but no nausea medication.  Her daughter presented in town yesterday and is accompanying patient today she reports patient has been barely able to get out of bed is not really able to walk. Patient reports chills and cold sweats and abdominal pain. Denies cough, chest pain, confusion, diarrhea, or constipation.     In the ED patient afebrile, hemodynamically stable, saturating well on room air. WBC 15. Na 146, AG 18, . TB 1.3 (trending up from recent labs). Lipase normal. Initial LA 3.0 and follow up 2.0. CT Abdomen performed and showed extensive liver lesions and distended gallbladder with stones and sludge without overt intra/extra biliary duct dilation. Patient started on zosyn and IVFs and admitted to the care of medicine for further evaluation and management.       Overview/Hospital Course:  MRCP imaging concerning for cholecystitis, recommending general surgery evaluation but in speaking with general  surgery, they recommend cindy tube decompression with IR. IR requested HIDA scan prior to PCT placement. PCT placed 5/25, draining dark bile. KINGSLEY on 5/26, likely from either contrast vs NSAID x1 dose vs pre-renal      Interval History: No acute events overnight. Renal function has plateaued. Continuing IVFs today as well. If renal function substantially improves by tomorrow she may be discharged. Otherwise, will request liver mass bx inpatient by IR tomorrow if possible.    Review of Systems   Constitutional:  Negative for chills and fever.   HENT:  Negative for congestion and sore throat.    Eyes:  Negative for photophobia and visual disturbance.   Respiratory:  Negative for cough and shortness of breath.    Cardiovascular:  Negative for chest pain and palpitations.   Gastrointestinal:  Positive for abdominal pain, nausea and vomiting. Negative for constipation and diarrhea.   Endocrine: Negative for cold intolerance and heat intolerance.   Genitourinary:  Negative for dysuria and hematuria.   Musculoskeletal:  Negative for arthralgias and myalgias.   Skin:  Negative for rash.   Allergic/Immunologic: Negative for environmental allergies and food allergies.   Neurological:  Negative for dizziness, seizures, syncope and headaches.   Hematological:  Negative for adenopathy. Does not bruise/bleed easily.   Psychiatric/Behavioral:  Negative for hallucinations and suicidal ideas.    Objective:     Vital Signs (Most Recent):  Temp: 98 °F (36.7 °C) (05/28/23 0746)  Pulse: 97 (05/28/23 0746)  Resp: 18 (05/28/23 0746)  BP: (!) 162/91 (05/28/23 0746)  SpO2: 98 % (05/28/23 0746) Vital Signs (24h Range):  Temp:  [96 °F (35.6 °C)-98.5 °F (36.9 °C)] 98 °F (36.7 °C)  Pulse:  [81-99] 97  Resp:  [16-20] 18  SpO2:  [93 %-98 %] 98 %  BP: (121-162)/(75-91) 162/91     Weight: 129 kg (284 lb 6.3 oz)  Body mass index is 52.02 kg/m².    Intake/Output Summary (Last 24 hours) at 5/28/2023 4506  Last data filed at 5/28/2023 0621  Gross per 24  hour   Intake --   Output 520 ml   Net -520 ml           Physical Exam  Constitutional:       Appearance: She is well-developed. She is ill-appearing.   HENT:      Head: Normocephalic and atraumatic.   Eyes:      General: No scleral icterus.     Conjunctiva/sclera: Conjunctivae normal.      Pupils: Pupils are equal, round, and reactive to light.   Neck:      Thyroid: No thyromegaly.      Vascular: No JVD.   Cardiovascular:      Rate and Rhythm: Normal rate and regular rhythm.      Heart sounds: Normal heart sounds. No murmur heard.    No friction rub. No gallop.   Pulmonary:      Effort: Pulmonary effort is normal.      Breath sounds: Normal breath sounds. No wheezing or rales.   Abdominal:      General: Bowel sounds are normal. There is no distension.      Palpations: Abdomen is soft.      Tenderness: There is abdominal tenderness (RUQ). There is no guarding or rebound.      Comments: PCT draining dark bile   Musculoskeletal:         General: No tenderness. Normal range of motion.      Cervical back: Neck supple.      Right lower leg: No edema.      Left lower leg: No edema.   Skin:     General: Skin is warm and dry.      Coloration: Skin is not jaundiced.   Neurological:      Mental Status: She is alert and oriented to person, place, and time.      Cranial Nerves: No cranial nerve deficit.   Psychiatric:         Behavior: Behavior normal.           Significant Labs: All pertinent labs within the past 24 hours have been reviewed.    Significant Imaging: I have reviewed all pertinent imaging results/findings within the past 24 hours.      Assessment/Plan:      * Cholecystitis  Secondary to extrinsic compression of patient's cystic duct. MRCP demonstrated markedly distended gallbladder containing numerous stones/biliary sludge. Common hepatic/proximal common bile duct is extrinsically compressed.  Cystic duct is not well visualized and may be obstructed/compressed.  Findings are concerning for acute cholecystitis. In  addition, patient with numerous liver mets.   - General surgery recommending IR intervention with PCT and liver mass bx simultaneously. Only able to accomplish PCT on 5/25, potentially consult IR for liver bx on 5/29.  - s/p PCT drain by IR, draining to gravity q12 hr NS flushes  - continue ivfs given KINGSLEY  - CLD  - prn antiemetics      KINGSLEY (acute kidney injury)  Patient with acute kidney injury likely due to JACQUELYN vs pre-renal vs NSAID induced KINGSLEY is currently worsening. Labs reviewed- Renal function/electrolytes with Estimated Creatinine Clearance: 20.5 mL/min (A) (based on SCr of 3.2 mg/dL (H)). according to latest data. Monitor urine output and serial BMP and adjust therapy as needed. Avoid nephrotoxins and renally dose meds for GFR listed above.   - Patient received two days of back to back contrast loads for various imaging modalities to diagnose her cholecystitis. Furthermore she required toradol for pain relief as she could not tolerate lying flat for her HIDA scan and opiates were contraindicated prior to that test  - Will continue to monitor renal function, CMP ordered daily  - Cr plateau on 5/28, will Continue fluids for today, LR @ 100cc/hr for 24 hrs    Nausea  Uncontrolled  Secondary to acute cholecystitis and malignancy.   - Continues to have episodes of emesis and persistent nausea with dry-heaving daily  - Increase zofran to 8mg q8h prn  - compazine 5mg q6h prn added as second agent    Hypernatremia  Resolved  Secondary to decreased PO intake.   - Na initially 147, then 145, now normal. Na improved after hypotonic fluid administration      Metastatic cancer to liver  Unknown primary, recently noted extensive lesions of Liver felt to represent metastatic disease.  Was scheduled for IR biopsy of liver lesion today but sent to ED for n/v fever/cholangitis  - IR consulted for PCT + liver mass bx. Only able to get PCT on 5/25, if patient remains inpatient will try to have liver mass bx on 5/30  - further  management as above      Chronic kidney disease, stage 3a  - Creatinine 1.7 on presentation  ; baseline 1.5  - avoid nephrotoxic agents as appropriate  - continue to monitor      Morbidly obese  Body mass index is 45.65 kg/m². Morbid obesity complicates all aspects of disease management from diagnostic modalities to treatment. Weight loss encouraged and health benefits explained to patient.         HTN (hypertension), benign  - holding home amlodipine and nightime clonidine while monitoring hemodynamics  - may restart amlodipine tomorrow once renal function improves to avoid hypotension also exacerbating KINGSLEY      Hypothyroid  - continue home synthroid        VTE Risk Mitigation (From admission, onward)         Ordered     heparin (porcine) injection 5,000 Units  Every 8 hours         05/24/23 1817     IP VTE HIGH RISK PATIENT  Once         05/24/23 1817     Place sequential compression device  Until discontinued         05/24/23 1817                Discharge Planning   ERICK: 5/28/2023     Code Status: Full Code   Is the patient medically ready for discharge?: No    Reason for patient still in hospital (select all that apply): Patient trending condition  Discharge Plan A: Home                  Nam Lao MD  Department of Hospital Medicine   [facility] - Telemetry Stepdown     none

## 2023-07-21 LAB
C. TRACHOMATIS, EXTERNAL RESULT: NEGATIVE
N. GONORRHOEAE, EXTERNAL RESULT: NEGATIVE

## 2023-10-02 ENCOUNTER — EMERGENCY (EMERGENCY)
Facility: HOSPITAL | Age: 28
LOS: 0 days | Discharge: HOME | End: 2023-10-03
Attending: EMERGENCY MEDICINE
Payer: COMMERCIAL

## 2023-10-02 ENCOUNTER — EMERGENCY (EMERGENCY)
Facility: HOSPITAL | Age: 28
LOS: 0 days | Discharge: HOME | End: 2023-10-02
Attending: EMERGENCY MEDICINE
Payer: COMMERCIAL

## 2023-10-02 VITALS
DIASTOLIC BLOOD PRESSURE: 75 MMHG | HEART RATE: 111 BPM | SYSTOLIC BLOOD PRESSURE: 133 MMHG | RESPIRATION RATE: 18 BRPM | OXYGEN SATURATION: 97 % | TEMPERATURE: 99 F

## 2023-10-02 VITALS
SYSTOLIC BLOOD PRESSURE: 122 MMHG | RESPIRATION RATE: 16 BRPM | TEMPERATURE: 98 F | HEART RATE: 99 BPM | OXYGEN SATURATION: 100 % | DIASTOLIC BLOOD PRESSURE: 72 MMHG

## 2023-10-02 VITALS — WEIGHT: 149.91 LBS | HEIGHT: 62 IN

## 2023-10-02 DIAGNOSIS — R06.02 SHORTNESS OF BREATH: ICD-10-CM

## 2023-10-02 DIAGNOSIS — F32.A DEPRESSION, UNSPECIFIED: ICD-10-CM

## 2023-10-02 DIAGNOSIS — O99.512 DISEASES OF THE RESPIRATORY SYSTEM COMPLICATING PREGNANCY, SECOND TRIMESTER: ICD-10-CM

## 2023-10-02 DIAGNOSIS — J34.89 OTHER SPECIFIED DISORDERS OF NOSE AND NASAL SINUSES: ICD-10-CM

## 2023-10-02 DIAGNOSIS — O98.512 OTHER VIRAL DISEASES COMPLICATING PREGNANCY, SECOND TRIMESTER: ICD-10-CM

## 2023-10-02 DIAGNOSIS — F41.9 ANXIETY DISORDER, UNSPECIFIED: ICD-10-CM

## 2023-10-02 DIAGNOSIS — Z20.822 CONTACT WITH AND (SUSPECTED) EXPOSURE TO COVID-19: ICD-10-CM

## 2023-10-02 DIAGNOSIS — O99.891 OTHER SPECIFIED DISEASES AND CONDITIONS COMPLICATING PREGNANCY: ICD-10-CM

## 2023-10-02 DIAGNOSIS — J45.901 UNSPECIFIED ASTHMA WITH (ACUTE) EXACERBATION: ICD-10-CM

## 2023-10-02 DIAGNOSIS — O99.342 OTHER MENTAL DISORDERS COMPLICATING PREGNANCY, SECOND TRIMESTER: ICD-10-CM

## 2023-10-02 DIAGNOSIS — Z3A.19 19 WEEKS GESTATION OF PREGNANCY: ICD-10-CM

## 2023-10-02 DIAGNOSIS — B97.7 PAPILLOMAVIRUS AS THE CAUSE OF DISEASES CLASSIFIED ELSEWHERE: ICD-10-CM

## 2023-10-02 DIAGNOSIS — Z98.890 OTHER SPECIFIED POSTPROCEDURAL STATES: Chronic | ICD-10-CM

## 2023-10-02 DIAGNOSIS — R00.0 TACHYCARDIA, UNSPECIFIED: ICD-10-CM

## 2023-10-02 DIAGNOSIS — Z88.0 ALLERGY STATUS TO PENICILLIN: ICD-10-CM

## 2023-10-02 DIAGNOSIS — J45.909 UNSPECIFIED ASTHMA, UNCOMPLICATED: ICD-10-CM

## 2023-10-02 DIAGNOSIS — R51.9 HEADACHE, UNSPECIFIED: ICD-10-CM

## 2023-10-02 DIAGNOSIS — R06.2 WHEEZING: ICD-10-CM

## 2023-10-02 DIAGNOSIS — B34.8 OTHER VIRAL INFECTIONS OF UNSPECIFIED SITE: ICD-10-CM

## 2023-10-02 LAB
ALBUMIN SERPL ELPH-MCNC: 3.1 G/DL — LOW (ref 3.3–5)
ALP SERPL-CCNC: 103 U/L — SIGNIFICANT CHANGE UP (ref 40–120)
ALT FLD-CCNC: 45 U/L — SIGNIFICANT CHANGE UP (ref 12–78)
ANION GAP SERPL CALC-SCNC: 6 MMOL/L — SIGNIFICANT CHANGE UP (ref 5–17)
AST SERPL-CCNC: 24 U/L — SIGNIFICANT CHANGE UP (ref 15–37)
BASOPHILS # BLD AUTO: 0.03 K/UL — SIGNIFICANT CHANGE UP (ref 0–0.2)
BASOPHILS NFR BLD AUTO: 0.2 % — SIGNIFICANT CHANGE UP (ref 0–2)
BILIRUB SERPL-MCNC: 0.2 MG/DL — SIGNIFICANT CHANGE UP (ref 0.2–1.2)
BUN SERPL-MCNC: 5 MG/DL — LOW (ref 7–23)
CALCIUM SERPL-MCNC: 8.5 MG/DL — SIGNIFICANT CHANGE UP (ref 8.5–10.1)
CHLORIDE SERPL-SCNC: 111 MMOL/L — HIGH (ref 96–108)
CO2 SERPL-SCNC: 22 MMOL/L — SIGNIFICANT CHANGE UP (ref 22–31)
CREAT SERPL-MCNC: 0.62 MG/DL — SIGNIFICANT CHANGE UP (ref 0.5–1.3)
EGFR: 125 ML/MIN/1.73M2 — SIGNIFICANT CHANGE UP
EOSINOPHIL # BLD AUTO: 0.25 K/UL — SIGNIFICANT CHANGE UP (ref 0–0.5)
EOSINOPHIL NFR BLD AUTO: 1.6 % — SIGNIFICANT CHANGE UP (ref 0–6)
GLUCOSE SERPL-MCNC: 108 MG/DL — HIGH (ref 70–99)
HCT VFR BLD CALC: 33.5 % — LOW (ref 34.5–45)
HGB BLD-MCNC: 12 G/DL — SIGNIFICANT CHANGE UP (ref 11.5–15.5)
IMM GRANULOCYTES NFR BLD AUTO: 0.5 % — SIGNIFICANT CHANGE UP (ref 0–0.9)
LYMPHOCYTES # BLD AUTO: 1.42 K/UL — SIGNIFICANT CHANGE UP (ref 1–3.3)
LYMPHOCYTES # BLD AUTO: 9.4 % — LOW (ref 13–44)
MCHC RBC-ENTMCNC: 29.9 PG — SIGNIFICANT CHANGE UP (ref 27–34)
MCHC RBC-ENTMCNC: 35.8 GM/DL — SIGNIFICANT CHANGE UP (ref 32–36)
MCV RBC AUTO: 83.5 FL — SIGNIFICANT CHANGE UP (ref 80–100)
MONOCYTES # BLD AUTO: 0.73 K/UL — SIGNIFICANT CHANGE UP (ref 0–0.9)
MONOCYTES NFR BLD AUTO: 4.8 % — SIGNIFICANT CHANGE UP (ref 2–14)
NEUTROPHILS # BLD AUTO: 12.66 K/UL — HIGH (ref 1.8–7.4)
NEUTROPHILS NFR BLD AUTO: 83.5 % — HIGH (ref 43–77)
PLATELET # BLD AUTO: 273 K/UL — SIGNIFICANT CHANGE UP (ref 150–400)
POTASSIUM SERPL-MCNC: 3.4 MMOL/L — LOW (ref 3.5–5.3)
POTASSIUM SERPL-SCNC: 3.4 MMOL/L — LOW (ref 3.5–5.3)
PROT SERPL-MCNC: 6.6 GM/DL — SIGNIFICANT CHANGE UP (ref 6–8.3)
RAPID RVP RESULT: DETECTED
RBC # BLD: 4.01 M/UL — SIGNIFICANT CHANGE UP (ref 3.8–5.2)
RBC # FLD: 13.2 % — SIGNIFICANT CHANGE UP (ref 10.3–14.5)
RV+EV RNA SPEC QL NAA+PROBE: DETECTED
SARS-COV-2 RNA SPEC QL NAA+PROBE: SIGNIFICANT CHANGE UP
SODIUM SERPL-SCNC: 139 MMOL/L — SIGNIFICANT CHANGE UP (ref 135–145)
WBC # BLD: 15.17 K/UL — HIGH (ref 3.8–10.5)
WBC # FLD AUTO: 15.17 K/UL — HIGH (ref 3.8–10.5)

## 2023-10-02 PROCEDURE — 36415 COLL VENOUS BLD VENIPUNCTURE: CPT

## 2023-10-02 PROCEDURE — 80053 COMPREHEN METABOLIC PANEL: CPT

## 2023-10-02 PROCEDURE — 99282 EMERGENCY DEPT VISIT SF MDM: CPT

## 2023-10-02 PROCEDURE — 76815 OB US LIMITED FETUS(S): CPT

## 2023-10-02 PROCEDURE — 99283 EMERGENCY DEPT VISIT LOW MDM: CPT

## 2023-10-02 PROCEDURE — 0225U NFCT DS DNA&RNA 21 SARSCOV2: CPT

## 2023-10-02 PROCEDURE — 94640 AIRWAY INHALATION TREATMENT: CPT

## 2023-10-02 PROCEDURE — 76815 OB US LIMITED FETUS(S): CPT | Mod: 26

## 2023-10-02 PROCEDURE — 85025 COMPLETE CBC W/AUTO DIFF WBC: CPT

## 2023-10-02 PROCEDURE — 99285 EMERGENCY DEPT VISIT HI MDM: CPT

## 2023-10-02 PROCEDURE — 99284 EMERGENCY DEPT VISIT MOD MDM: CPT | Mod: 25

## 2023-10-02 RX ORDER — SODIUM CHLORIDE 9 MG/ML
1000 INJECTION INTRAMUSCULAR; INTRAVENOUS; SUBCUTANEOUS ONCE
Refills: 0 | Status: COMPLETED | OUTPATIENT
Start: 2023-10-02 | End: 2023-10-02

## 2023-10-02 RX ORDER — IPRATROPIUM/ALBUTEROL SULFATE 18-103MCG
3 AEROSOL WITH ADAPTER (GRAM) INHALATION
Refills: 0 | Status: COMPLETED | OUTPATIENT
Start: 2023-10-02 | End: 2023-10-02

## 2023-10-02 RX ORDER — IPRATROPIUM/ALBUTEROL SULFATE 18-103MCG
3 AEROSOL WITH ADAPTER (GRAM) INHALATION ONCE
Refills: 0 | Status: COMPLETED | OUTPATIENT
Start: 2023-10-02 | End: 2023-10-02

## 2023-10-02 RX ADMIN — SODIUM CHLORIDE 1000 MILLILITER(S): 9 INJECTION INTRAMUSCULAR; INTRAVENOUS; SUBCUTANEOUS at 15:29

## 2023-10-02 RX ADMIN — Medication 3 MILLILITER(S): at 16:31

## 2023-10-02 RX ADMIN — Medication 3 MILLILITER(S): at 19:51

## 2023-10-02 RX ADMIN — Medication 40 MILLIGRAM(S): at 21:08

## 2023-10-02 RX ADMIN — Medication 3 MILLILITER(S): at 15:29

## 2023-10-02 RX ADMIN — Medication 3 MILLILITER(S): at 16:32

## 2023-10-02 NOTE — ED ADULT TRIAGE NOTE - INTERNATIONAL TRAVEL
"Chief Complaint   Patient presents with     New Patient     Establish Care     Physical     w/pap?     Blood Draw     patient IS fasting?       Initial /72 mmHg  Pulse 68  Temp(Src) 98.3  F (36.8  C) (Oral)  Resp 16  Ht 5' 0.75\" (1.543 m)  Wt 116 lb (52.617 kg)  BMI 22.10 kg/m2  LMP 01/04/2017 (Exact Date)  Breastfeeding? No Estimated body mass index is 22.1 kg/(m^2) as calculated from the following:    Height as of this encounter: 5' 0.75\" (1.543 m).    Weight as of this encounter: 116 lb (52.617 kg).  BP completed using cuff size: regular  Antionette Palma MA      "
No

## 2023-10-02 NOTE — ED STATDOCS - NS ED ROS FT
Constitutional: No fever or chills  Eyes: No visual changes  HEENT: No throat pain. +Rhinorrhea  CV: No chest pain  Resp: +SOB, sneezing  GI: No abd pain, nausea or vomiting  : No dysuria  MSK: No musculoskeletal pain  Skin: No rash  Neuro: +Headache

## 2023-10-02 NOTE — ED ADULT NURSE NOTE - NSFALLUNIVINTERV_ED_ALL_ED
Bed/Stretcher in lowest position, wheels locked, appropriate side rails in place/Call bell, personal items and telephone in reach/Instruct patient to call for assistance before getting out of bed/chair/stretcher/Non-slip footwear applied when patient is off stretcher/[place] to call system/Physically safe environment - no spills, clutter or unnecessary equipment/Purposeful proactive rounding/Room/bathroom lighting operational, light cord in reach

## 2023-10-02 NOTE — ED STATDOCS - ATTENDING APP SHARED VISIT CONTRIBUTION OF CARE
I, Lukas Coles MD, personally saw the patient with ACP.  I have personally performed a face to face diagnostic evaluation on this patient.  I have reviewed the ACP note and agree with the history, exam, and plan of care, except as noted.   The initial assessment was performed by myself and then the patient was handed off to the ACP. The patient was followed and re-evaluated by the ACP. All labs, imaging and procedures were evaluated and performed by the ACP and I was available for consultation if any questions in the patients care came up.

## 2023-10-02 NOTE — ED STATDOCS - NSFOLLOWUPINSTRUCTIONS_ED_ALL_ED_FT
CALL YOUR DOCTOR TOMORROW TO FURTHER DISCUSS TREATMENT PLANS AND OPTIONS    Upper Respiratory Infection, Adult  An upper respiratory infection (URI) affects the nose, throat, and upper air passages. URIs are caused by germs (viruses). The most common type of URI is often called "the common cold."    Medicines cannot cure URIs, but you can do things at home to relieve your symptoms. URIs usually get better within 7–10 days.    Follow these instructions at home:  Activity     Rest as needed.  If you have a fever, stay home from work or school until your fever is gone, or until your doctor says you may return to work or school.    You should stay home until you cannot spread the infection anymore (you are not contagious).  Your doctor may have you wear a face mask so you have less risk of spreading the infection.    Relieving symptoms     Gargle with a salt-water mixture 3–4 times a day or as needed. To make a salt-water mixture, completely dissolve ½–1 tsp of salt in 1 cup of warm water.  Use a cool-mist humidifier to add moisture to the air. This can help you breathe more easily.  Eating and drinking     Drink enough fluid to keep your pee (urine) pale yellow.  ImageEat soups and other clear broths.  General instructions     Take over-the-counter and prescription medicines only as told by your doctor. These include cold medicines, fever reducers, and cough suppressants.  Do not use any products that contain nicotine or tobacco. These include cigarettes and e-cigarettes. If you need help quitting, ask your doctor.  Avoid being where people are smoking (avoid secondhand smoke).  Make sure you get regular shots and get the flu shot every year.  ImageKeep all follow-up visits as told by your doctor. This is important.  How to avoid spreading infection to others     Wash your hands often with soap and water. If you do not have soap and water, use hand .  Avoid touching your mouth, face, eyes, or nose.  ImageCough or sneeze into a tissue or your sleeve or elbow. Do not cough or sneeze into your hand or into the air.  Contact a doctor if:  You are getting worse, not better.  You have any of these:    A fever.  Chills.  Brown or red mucus in your nose.  Yellow or brown fluid (discharge)coming from your nose.  Pain in your face, especially when you bend forward.  Swollen neck glands.  Pain with swallowing.  White areas in the back of your throat.    Get help right away if:  You have shortness of breath that gets worse.  You have very bad or constant:    Headache.  Ear pain.  Pain in your forehead, behind your eyes, and over your cheekbones (sinus pain).  Chest pain.    You have long-lasting (chronic) lung disease along with any of these:    Wheezing.  Long-lasting cough.  Coughing up blood.  A change in your usual mucus.    You have a stiff neck.  You have changes in your:    Vision.  Hearing.  Thinking.  Mood.    Summary  An upper respiratory infection (URI) is caused by a germ called a virus. The most common type of URI is often called "the common cold."  URIs usually get better within 7–10 days.  Take over-the-counter and prescription medicines only as told by your doctor.  This information is not intended to replace advice given to you by your health care provider. Make sure you discuss any questions you have with your health care provider.      Asthma, Adult  Outline of a person's upper body showing the lungs, with close-ups of two airways, one normal and one narrow.  Asthma is a long-term (chronic) condition that causes recurrent episodes in which the lower airways in the lungs become tight and narrow. The narrowing is caused by inflammation and tightening of the smooth muscle around the lower airways.    Asthma episodes, also called asthma attacks or asthma flares, may cause coughing, making high-pitched whistling sounds when you breathe, most often when you breathe out (wheezing), shortness of breath, and chest pain. The airways may produce extra mucus caused by the inflammation and irritation. During an attack, it can be difficult to breathe. Asthma attacks can range from minor to life-threatening.    Asthma cannot be cured, but medicines and lifestyle changes can help control it and treat acute attacks. It is important to keep your asthma well controlled so the condition does not interfere with your daily life.    What are the causes?  This condition is believed to be caused by inherited (genetic) and environmental factors, but its exact cause is not known.    What can trigger an asthma attack?    Many things can bring on an asthma attack or make symptoms worse. These triggers are different for every person. Common triggers include:  Allergens and irritants like mold, dust, pet dander, cockroaches, pollen, air pollution, and chemical odors.  Cigarette smoke.  Weather changes and cold air.  Stress and strong emotional responses such as crying or laughing hard.  Certain medications such as aspirin or beta blockers.  Infections and inflammatory conditions, such as the flu, a cold, pneumonia, or inflammation of the nasal membranes (rhinitis).  Gastroesophageal reflux disease (GERD).  What are the signs or symptoms?  Symptoms may occur right after exposure to an asthma trigger or hours later and can vary by person. Common signs and symptoms include:  Wheezing.  Trouble breathing (shortness of breath).  Excessive nighttime or early morning coughing.  Chest tightness.  Tiredness (fatigue) with minimal activity.  Difficulty talking in complete sentences.  Poor exercise tolerance.  How is this diagnosed?  This condition is diagnosed based on:  A physical exam and your medical history.  Tests, which may include:  Lung function studies to evaluate the flow of air in your lungs.  Allergy tests.  Imaging tests, such as X-rays.  How is this treated?  There is no cure, but symptoms can be controlled with proper treatment. Treatment usually involves:  Identifying and avoiding your asthma triggers.  Inhaled medicines. Two types are commonly used to treat asthma, depending on severity:  Controller medicines. These help prevent asthma symptoms from occurring. They are taken every day.  Fast-acting reliever or rescue medicines. These quickly relieve asthma symptoms. They are used as needed and provide short-term relief.  Using other medicines, such as:  Allergy medicines, such as antihistamines, if your asthma attacks are triggered by allergens.  Immune medicines (immunomodulators). These are medicines that help control the immune system.  Using supplemental oxygen. This is only needed during a severe episode.  Creating an asthma action plan. An asthma action plan is a written plan for managing and treating your asthma attacks. This plan includes:  A list of your asthma triggers and how to avoid them.  Information about when medicines should be taken and when their dosage should be changed.  Instructions about using a device called a peak flow meter. A peak flow meter measures how well the lungs are working and the severity of your asthma. It helps you monitor your condition.  Follow these instructions at home:  Take over-the-counter and prescription medicines only as told by your health care provider.  Stay up to date on all vaccinations as recommended by your healthcare provider, including vaccines for the flu and pneumonia.  Use a peak flow meter and keep track of your peak flow readings.  Understand and use your asthma action plan to address any asthma flares.  Do not smoke or allow anyone to smoke in your home.  Contact a health care provider if:  You have wheezing, shortness of breath, or a cough that is not responding to medicines.  Your medicines are causing side effects, such as a rash, itching, swelling, or trouble breathing.  You need to use a reliever medicine more than 2–3 times a week.  Your peak flow reading is still at 50–79% of your personal best after following your action plan for 1 hour.  You have a fever and shortness of breath.  Get help right away if:  You are getting worse and do not respond to treatment during an asthma attack.  You are short of breath when at rest or when doing very little physical activity.  You have difficulty eating, drinking, or talking.  You have chest pain or tightness.  You develop a fast heartbeat or palpitations.  You have a bluish color to your lips or fingernails.  You are light-headed or dizzy, or you faint.  Your peak flow reading is less than 50% of your personal best.  You feel too tired to breathe normally.  These symptoms may be an emergency. Get help right away. Call 911.  Do not wait to see if the symptoms will go away.  Do not drive yourself to the hospital.  Summary  Asthma is a long-term (chronic) condition that causes recurrent episodes in which the airways become tight and narrow. Asthma episodes, also called asthma attacks or asthma flares, can cause coughing, wheezing, shortness of breath, and chest pain.  Asthma cannot be cured, but medicines and lifestyle changes can help keep it well controlled and prevent asthma flares.  Make sure you understand how to avoid triggers and how and when to use your medicines.  Asthma attacks can range from minor to life-threatening. Get help right away if you have an asthma attack and do not respond to treatment with your usual rescue medicines.  This information is not intended to replace advice given to you by your health care provider. Make sure you discuss any questions you have with your health care provider.

## 2023-10-02 NOTE — ED STATDOCS - OBJECTIVE STATEMENT
29 y/o female , currently 19 weeks pregnant, with PMHx of gestational HTN, asthma presents to the ED c/o sneezing, headache, and rhinorrhea x2 days, today with wheezing/SOB unrelieved by albuterol inhaler, which patient believes is due to overuse. Nonsmoker, nondrinker. Allergic to Penicillin. 27 y/o female , currently 19 weeks pregnant, with PMHx of gestational HTN, asthma presents to the ED c/o sneezing, headache, and rhinorrhea x2 days, today with wheezing/SOB unrelieved by albuterol inhaler,  Nonsmoker, nondrinker. Allergic to Penicillin.

## 2023-10-02 NOTE — ED ADULT TRIAGE NOTE - ARRIVAL FROM
Patient is a 73-year-old  male with a mass in the hepatic flexure of his colon the who was admitted postoperatively after a partial colectomy and reanastomosis.  The patient was initially attempted as robotic procedure in converted to open procedure with exploratory laparotomy.  He remains sedated postoperatively but is otherwise in no acute distress on exam.   Home

## 2023-10-02 NOTE — ED ADULT TRIAGE NOTE - CHIEF COMPLAINT QUOTE
discharged from [facility] 2 hours ago. c/o flu like sx. + entero/rhinovirus. currently 19 weeks pregnant. Pt received albuterol and prednisone with relief.

## 2023-10-02 NOTE — ED STATDOCS - NS_ ATTENDINGSCRIBEDETAILS _ED_A_ED_FT
I, Lukas Coles MD,  performed the initial face to face bedside interview with this patient regarding history of present illness, review of symptoms and relevant past medical, social and family history.  I completed an independent physical examination.  I was the initial provider who evaluated this patient.   I personally saw the patient and performed a substantive portion of the visit including all aspects of the medical decision making.  The history, relevant review of systems, past medical and surgical history, medical decision making, and physical examination was documented by the scribe in my presence and I attest to the accuracy of the documentation.

## 2023-10-02 NOTE — ED STATDOCS - PROGRESS NOTE DETAILS
patient seen and re-evaluated.  Lasting 4 hours between nebulizer treatments.  Feeling improved.  No wheezing on lung exam, spo2 100%.  She is concerned she still feels SOB and that she needs to use albuterol so often.  Case d/w ob/gyn team to help decide safety of use of steroids in pregnancy.  as she is not a patient of a [facility] physician, they did not come see her and advised that she needs to make this decision with her own ob/gyn, however steroids are often used in second trimester to help develop the lungs of the fetus and are considered safe.  Had this discussion with patient who agrees to one dose of prednisone here and an rx to be sent and she will further discuss her options with her ob/gyn tomorrow.  return precautions and importance of close follow up advised -Kassandra Reveles PA-C

## 2023-10-02 NOTE — ED STATDOCS - NSICDXPASTMEDICALHX_GEN_ALL_CORE_FT
PAST MEDICAL HISTORY:  Anxiety and depression     Asthma     H/O syncope     Headache     HPV in female History of .    Lower [place] gland cyst

## 2023-10-02 NOTE — ED STATDOCS - PHYSICAL EXAMINATION
Constitutional: NAD AAOx3  Eyes: PERRLA EOMI  Head: Normocephalic atraumatic  Mouth: MMM  Cardiac: regular rate   Resp: +Wheezing bilaterally.  GI: Abd s/nt/nd  Neuro: grossly normal and intact  Skin: No visible rashes Constitutional: NAD AAOx3  Eyes: PERRLA EOMI  Head: Normocephalic atraumatic  Mouth: MMM  Cardiac: tachycardic   Resp: +Wheezing bilaterally.  GI: Abd s/nt/nd  Neuro: grossly normal and intact  Skin: No visible rashes

## 2023-10-02 NOTE — ED ADULT NURSE NOTE - NSICDXPASTMEDICALHX_GEN_ALL_CORE_FT
PAST MEDICAL HISTORY:  Anxiety and depression     Asthma     H/O syncope     Headache     HPV in female History of .    [place]'s gland cyst

## 2023-10-02 NOTE — ED ADULT NURSE NOTE - OBJECTIVE STATEMENT
Pt is AOx4 from home c/o sob and wheezing beginning last night and becoming unbearable today. PMHx asthma and states she inhaler has not improved s/s. Pregnant at 19 weeks.  Pt able to speak in full sentences. Denies chest pain, fevers, chills, n/v/d.

## 2023-10-02 NOTE — ED STATDOCS - CLINICAL SUMMARY MEDICAL DECISION MAKING FREE TEXT BOX
27 y/o female  with PMHx of asthma presents to the ED for upper respiratory symptoms in the setting of asthma, sent in for evaluation. Here patient is wheezing bilaterally and is tachycardic. Tachycardia likely from frequent albuterol use. Will symptom control, fetal HR, and reassess. No concern for PE. 27 y/o female  with PMHx of asthma presents to the ED for upper respiratory symptoms in the setting of asthma, sent in for evaluation. Here patient is wheezing bilaterally and is tachycardic. Tachycardia likely from frequent albuterol use. pt with URI symptoms. Will symptom control, fetal HR, and reassess. No concern for PE. 27 y/o female  with PMHx of asthma presents to the ED for upper respiratory symptoms in the setting of asthma, sent in for evaluation. Here patient is wheezing bilaterally and is tachycardic. Tachycardia likely from frequent albuterol use. pt with URI symptoms. Will symptom control, fetal HR, and reassess. No concern for PE.    spoke with obgyn will see patient

## 2023-10-03 NOTE — ED STATDOCS - CLINICAL SUMMARY MEDICAL DECISION MAKING FREE TEXT BOX
No hypoxia, no respiratory distress, no wheezing on exam.  States feels much improved from at home.  Recommend continued supportive care. f/u with PCP.

## 2023-10-03 NOTE — ASU PREOP CHECKLIST - TEMPERATURE IN CELSIUS (DEGREES C)
36.7 Medical Necessity Clause: This procedure was medically necessary because the lesions that were treated were: Consent: The patient's consent was obtained including but not limited to risks of crusting, scabbing, blistering, scarring, darker or lighter pigmentary change, recurrence, incomplete removal and infection. Show Topical Anesthesia Variable?: Yes Number Of Freeze-Thaw Cycles: 3 freeze-thaw cycles Render Post-Care Instructions In Note?: no Detail Level: Detailed Medical Necessity Information: It is in your best interest to select a reason for this procedure from the list below. All of these items fulfill various CMS LCD requirements except the new and changing color options. Post-Care Instructions: I reviewed with the patient in detail post-care instructions. Patient is to wear sunprotection, and avoid picking at any of the treated lesions. Pt may apply Vaseline to crusted or scabbing areas. Spray Paint Text: The liquid nitrogen was applied to the skin utilizing a spray paint frosting technique. Duration Of Freeze Thaw-Cycle (Seconds): 0

## 2023-10-03 NOTE — ED ADULT NURSE NOTE - CHIEF COMPLAINT QUOTE
discharged from [facility] 2 hours ago. c/o flu like sx. + entero/rhinovirus. currently 19 weeks pregnant. Pt received albuterol and prednisone with relief.

## 2023-10-03 NOTE — ED STATDOCS - PATIENT PORTAL LINK FT
You can access the FollowMyHealth Patient Portal offered by [facility] by registering at the following website: [email or web address]. By joining [employer]’s FollowMyHealth portal, you will also be able to view your health information using other applications (apps) compatible with our system.

## 2023-10-03 NOTE — ED STATDOCS - RESPIRATORY, MLM
breath sounds clear and equal bilaterally. speaking full sentences, no wheezing, no stridor, no respiratory distress.

## 2023-10-03 NOTE — ED STATDOCS - OBJECTIVE STATEMENT
27 yo F hx of asthma, 19 weeks gestation, presents with CC SOB.  patient seen earlier in ED for wheezing, SOB.  Tested positive for rhino/enterovirus.  Given steroids, albuterol with improvement.  Patient states she felt better after leaving, got home and started feeling worse again.  States she thinks she's allergic to her dogs which is worsening symptoms.  Patient used inhaler, took a benadryl and returned to ED for further evaluation.  Currently states she's feeling much better on arrival to ED.

## 2023-12-22 LAB
ABO, EXTERNAL RESULT: NORMAL
HEP B, EXTERNAL RESULT: NEGATIVE
HIV, EXTERNAL RESULT: NEGATIVE
RPR, EXTERNAL RESULT: NORMAL
RUBELLA TITER, EXTERNAL RESULT: NORMAL

## 2024-01-16 ENCOUNTER — HOSPITAL ENCOUNTER (OUTPATIENT)
Facility: HOSPITAL | Age: 29
Discharge: HOME | End: 2024-01-17
Attending: OBSTETRICS & GYNECOLOGY | Admitting: OBSTETRICS & GYNECOLOGY

## 2024-01-16 PROBLEM — I10 HYPERTENSION: Status: ACTIVE | Noted: 2024-01-16

## 2024-01-16 PROBLEM — J45.909 ASTHMA: Status: ACTIVE | Noted: 2024-01-16

## 2024-01-16 RX ORDER — ASPIRIN 81 MG/1
81 TABLET ORAL DAILY
COMMUNITY

## 2024-01-17 VITALS
HEART RATE: 69 BPM | OXYGEN SATURATION: 98 % | TEMPERATURE: 97.7 F | DIASTOLIC BLOOD PRESSURE: 71 MMHG | RESPIRATION RATE: 16 BRPM | SYSTOLIC BLOOD PRESSURE: 132 MMHG

## 2024-01-17 PROBLEM — O36.8131: Status: ACTIVE | Noted: 2024-01-17

## 2024-01-17 PROCEDURE — 59025 FETAL NON-STRESS TEST: CPT

## 2024-01-17 PROCEDURE — 99203 OFFICE O/P NEW LOW 30 MIN: CPT

## 2024-01-17 PROCEDURE — 2580000003 HC RX 258: Performed by: ADVANCED PRACTICE MIDWIFE

## 2024-01-17 RX ORDER — SODIUM CHLORIDE, SODIUM LACTATE, POTASSIUM CHLORIDE, AND CALCIUM CHLORIDE .6; .31; .03; .02 G/100ML; G/100ML; G/100ML; G/100ML
1000 INJECTION, SOLUTION INTRAVENOUS ONCE
Status: COMPLETED | OUTPATIENT
Start: 2024-01-17 | End: 2024-01-17

## 2024-01-17 RX ADMIN — SODIUM CHLORIDE, POTASSIUM CHLORIDE, SODIUM LACTATE AND CALCIUM CHLORIDE 1000 ML: 600; 310; 30; 20 INJECTION, SOLUTION INTRAVENOUS at 00:21

## 2024-01-17 NOTE — H&P
History & Physical    Name: Agustina Hubbard MRN: [id number]  SSN: [id number]    YOB: 1995  Age: 28 y.o.  Sex: female        Subjective:     Estimated Date of Delivery: 24  OB History    Para Term  AB Living   2 1 1     1   SAB IAB Ectopic Molar Multiple Live Births                    # Outcome Date GA Lbr Clark/2nd Weight Sex Delivery Anes PTL Lv   2 Current            1 Term                Ms. Hubbard is a 28 y.o.  seen with pregnancy at 34w2d by LMP, confirmed by 1st trimester US, for post-coital vaginal bleeding and decreased fetal movement. Patient reports having intercourse around 6:30PM and then noted to have bright red blood, amount similar to light period. It resolved shortly after. She also noted decrease FM around same time. Prenatal course was complicated by hx of gestational HTN and postpartum PreE (on ASA) in first pregnancy, asthma (uses inhalers), Patient denies ROM. Patient denies changes in vision, headache, fever, CP, SOB, nausea/vomiting, RUQ pain, LE edema, and calf tenderness/pain.     Of note: patient recently moved over weekend from [facility] to [place] and was following OB there. LOV 23, next OV scheduled for 23. PNL shown on phone were normal. Wishes to move OB care to [place] practice.     Past Medical History:   Diagnosis Date    Asthma     inhaler use    Hypertension     Gestational HTN w/ 1st pregnancy     History reviewed. No pertinent surgical history.  Social History     Occupational History    Not on file   Tobacco Use    Smoking status: Never    Smokeless tobacco: Never   Vaping Use    Vaping Use: Former   Substance and Sexual Activity    Alcohol use: Not Currently    Drug use: Never    Sexual activity: Yes     Partners: Male     History reviewed. No pertinent family history.    Allergies   Allergen Reactions    Pcn [Penicillins] Hives     Prior to Admission medications    Medication Sig Start Date End Date Taking? Authorizing Provider

## 2024-01-17 NOTE — DISCHARGE INSTRUCTIONS
Counting Your Baby's Kicks: Care Instructions  Overview     Counting your baby's kicks is one way your doctor can tell that your baby is healthy. You will probably feel your baby move for the first time between 16 and 22 weeks. The movement may feel like flutters rather than kicks. Your baby may move more at certain times of the day. When you are active, you may notice less kicking than when you are resting. At your prenatal visits, your doctor will ask whether the baby is active.  In your last trimester, your doctor may ask you to count the number of times you feel your baby move.  Follow-up care is a key part of your treatment and safety. Be sure to make and go to all appointments, and call your doctor if you are having problems. It's also a good idea to know your test results and keep a list of the medicines you take.  How do you count fetal kicks?  A common method of checking your baby's movement is to note the length of time it takes to count 10 movements (such as kicks, flutters, or rolls).  Pick your baby's most active time of day to count. This may be any time from morning to evening.  If you don't feel 10 movements in an hour, have something to eat or drink and count for another hour. If you don't feel at least 10 movements in the 2-hour period, call your doctor.  Do not use an at-home Doppler heart monitor in place of counting fetal movements.  When should you call for help?   Call your doctor now or seek immediate medical care if:    You feel fewer than 10 movements in a 2-hour period.     You noticed that your baby has stopped moving or is moving less than normal.   Watch closely for changes in your health, and be sure to contact your doctor if you have any problems.  Where can you learn more?  Go to https://www.healthwise.net/patientEd and enter U048 to learn more about \"Counting Your Baby's Kicks: Care Instructions.\"  Current as of: July 11, 2023               Content Version: 13.9  © 3465-1768

## 2024-01-17 NOTE — PROGRESS NOTES
2240 pt arrived with complaints of decreased fetal movt and scant bleeding after intercourse tonight    2303 notified MIRI Hernandez CNM of pt arrival. RN will call residents too    2310 RN called family practice resident of pt arrival    2330 Per resident pt showed prenatal labs on her phone from patient portal, labs WNL    0009 cervix closed per CNM. Verbal order to do IVF bolus due to irritability on EFM and ok to discharge after bolus per CNM    0200 DC papers reviewed with pt and all questions answered

## 2024-01-19 ENCOUNTER — OFFICE VISIT (OUTPATIENT)
Age: 29
End: 2024-01-19

## 2024-01-19 VITALS
HEART RATE: 90 BPM | TEMPERATURE: 97.9 F | HEIGHT: 63 IN | RESPIRATION RATE: 18 BRPM | SYSTOLIC BLOOD PRESSURE: 112 MMHG | DIASTOLIC BLOOD PRESSURE: 79 MMHG | OXYGEN SATURATION: 98 % | WEIGHT: 166 LBS | BODY MASS INDEX: 29.41 KG/M2

## 2024-01-19 DIAGNOSIS — J45.21 EXACERBATION OF INTERMITTENT ASTHMA, UNSPECIFIED ASTHMA SEVERITY: Primary | ICD-10-CM

## 2024-01-19 DIAGNOSIS — Z34.93 THIRD TRIMESTER PREGNANCY: ICD-10-CM

## 2024-01-19 RX ORDER — ALBUTEROL SULFATE 0.63 MG/3ML
1 SOLUTION RESPIRATORY (INHALATION) EVERY 6 HOURS PRN
COMMUNITY

## 2024-01-19 RX ORDER — MONTELUKAST SODIUM 10 MG/1
10 TABLET ORAL DAILY
Qty: 30 TABLET | Refills: 3 | Status: SHIPPED | OUTPATIENT
Start: 2024-01-19

## 2024-01-19 RX ORDER — PREDNISONE 20 MG/1
20 TABLET ORAL 2 TIMES DAILY
Qty: 10 TABLET | Refills: 0 | Status: SHIPPED | OUTPATIENT
Start: 2024-01-19 | End: 2024-01-24

## 2024-01-19 RX ORDER — ALBUTEROL SULFATE 90 UG/1
2 AEROSOL, METERED RESPIRATORY (INHALATION) EVERY 4 HOURS PRN
Qty: 18 G | Refills: 0 | Status: SHIPPED | OUTPATIENT
Start: 2024-01-19

## 2024-01-19 NOTE — PROGRESS NOTES
Agustina Hubbard (:  1995) is a 28 y.o. female,New patient, here for evaluation of the following chief complaint(s):  Asthma (Asthma getting worse - 9 mos pregnant, asthma getting worse this past month.  Has Rescue inhaler, but would like to tall about inhaler or other Rx options.  New to the area, PCP not found yet.)      ASSESSMENT/PLAN:  Visit Diagnoses and Associated Orders       ORDERS WITHOUT AN ASSOCIATED DIAGNOSIS    albuterol (ACCUNEB) 0.63 MG/3ML nebulizer solution [03242]               Rx Prednisone. Albuterol MDI PRN as directed. Start Singulair. Pepcid and TUMS PRN.    Discussed s/sx to monitor for.   Follow up in 2-3 days if symptoms persist or if symptoms worsen.    SUBJECTIVE/OBJECTIVE:  HPI     28 y.o. female presents with symptoms of asthma.    Asthma (Asthma getting worse - 9 mos pregnant, asthma getting worse this past month.  Has Rescue inhaler, but would like to tall about inhaler or other Rx options.  New to the area, PCP not found yet.)    Just moved to area.  Zyrtec daily.    Coughing fits, worse at night, activity.    Previously Asmenex Twist inhaler.     Due , girl. Just moved and looking for OB.     Had 2 months ago and got better then Asthma been worsening.    TUMS prn reflux.    Vitals:    24 1252   BP: 112/79   Site: Right Upper Arm   Position: Sitting   Cuff Size: Medium Adult   Pulse: 90   Resp: 18   Temp: 97.9 °F (36.6 °C)   TempSrc: Oral   SpO2: 98%   Weight: 75.3 kg (166 lb)   Height: 1.6 m (5' 3\")       No results found for this visit on 24.     Physical Exam         An electronic signature was used to authenticate this note.    Bindu Harkins, APRN - CNP

## 2024-01-19 NOTE — PATIENT INSTRUCTIONS
Continue Zyrtec.   Start Singulair at bedtime.  Prednisone as directed with food. Monitor blood pressure.  Continue TUMS as needed. May use Pepcid over-the-continue daily as needed.

## 2024-02-08 LAB — GBS, EXTERNAL RESULT: POSITIVE

## 2024-02-13 ENCOUNTER — ROUTINE PRENATAL (OUTPATIENT)
Age: 29
End: 2024-02-13
Payer: COMMERCIAL

## 2024-02-13 VITALS — SYSTOLIC BLOOD PRESSURE: 108 MMHG | DIASTOLIC BLOOD PRESSURE: 70 MMHG | HEART RATE: 94 BPM

## 2024-02-13 DIAGNOSIS — Z03.74 SUSPECTED PROBLEM WITH FETAL GROWTH NOT FOUND: ICD-10-CM

## 2024-02-13 DIAGNOSIS — O36.5930 POOR FETAL GROWTH AFFECTING MANAGEMENT OF MOTHER IN THIRD TRIMESTER, SINGLE OR UNSPECIFIED FETUS: Primary | ICD-10-CM

## 2024-02-13 PROCEDURE — 99202 OFFICE O/P NEW SF 15 MIN: CPT | Performed by: OBSTETRICS & GYNECOLOGY

## 2024-02-13 PROCEDURE — 76811 OB US DETAILED SNGL FETUS: CPT | Performed by: OBSTETRICS & GYNECOLOGY

## 2024-02-13 PROCEDURE — 3074F SYST BP LT 130 MM HG: CPT | Performed by: OBSTETRICS & GYNECOLOGY

## 2024-02-13 PROCEDURE — 76819 FETAL BIOPHYS PROFIL W/O NST: CPT | Performed by: OBSTETRICS & GYNECOLOGY

## 2024-02-13 PROCEDURE — 3078F DIAST BP <80 MM HG: CPT | Performed by: OBSTETRICS & GYNECOLOGY

## 2024-02-13 RX ORDER — VALACYCLOVIR HYDROCHLORIDE 1 G/1
1000 TABLET, FILM COATED ORAL 2 TIMES DAILY
Status: ON HOLD | COMMUNITY
End: 2024-02-17 | Stop reason: HOSPADM

## 2024-02-13 NOTE — PROCEDURES
PATIENT: KIM BRAUN   -  : 1995   -  DOS:2024   -  INTERPRETING PROVIDER:Milton Arana,   Indication  ========    Fetal growth restriction on outside scan    Method  ======    Transabdominal ultrasound examination. View: Suboptimal view: limited by fetal position. Suboptimal view: limited by late gestational age    Dating  ======    LMP on: 2023  Cycle: regular cycle  GA by LMP 40 w + 1 d  BELLA by LMP: 2024  Previous Ultrasound on: 2023  Type of prior assessment: GA  GA at prior assessment date 10 w + 0 d  GA by previous U/S 38 w + 1 d  BELLA by previous Ultrasound: 2024  Ultrasound examination on: 2024  GA by U/S based upon: AC, BPD, Femur, HC  GA by U/S 35 w + 1 d  BELLA by U/S: 3/18/2024  Assigned: based on ultrasound (GA), selected on 2024  Assigned GA 38 w + 1 d  Assigned BELLA: 2024    Fetal Growth Overview  =================    Exam date        GA              BPD (mm)           HC (mm)              AC (mm)           FL (mm)             HL (mm)             EFW (g)  2024        38w 1d        83.7     <1%        304.6    <1%        319     10%        72.7    30%        66.8     93%        2753    12%    Fetal Biometry  ============    Standard  BPD 83.7 mm 33w 5d <1% Hadlock  .2 mm 35w 3d 12% Bimal  .6 mm 33w 6d <1% Hadlock  Cerebellum tr 4.1 mm -/- Hill  .0 mm 35w 6d 10% Hadlock  Femur 72.7 mm 37w 2d 30% Hadlock  Humerus 66.8 mm 38w 5d 93% Bimal  EFW 2,753 g 35w 5d 12% Hadlock  EFW (lb) 6 lb  EFW (oz) 1 oz  EFW by: Hadlock (BPD-HC-AC-FL)  Extended  Nasal bone 10.2 mm  Head / Face / Neck  Nasal bone: present  Other Structures   bpm    General Evaluation  ==============    Cardiac activity present.  bpm. Fetal movements: visualized. Presentation: Cephalic  Placenta: Placental site: posterior, no previa on prior exam elsewhere.  Umbilical cord: Cord vessels: 3 vessel cord. Insertion site: placental insertion normal  Amniotic

## 2024-02-15 ENCOUNTER — HOSPITAL ENCOUNTER (INPATIENT)
Facility: HOSPITAL | Age: 29
LOS: 2 days | Discharge: HOME | End: 2024-02-17
Attending: STUDENT IN AN ORGANIZED HEALTH CARE EDUCATION/TRAINING PROGRAM | Admitting: OBSTETRICS & GYNECOLOGY
Payer: COMMERCIAL

## 2024-02-15 PROBLEM — Z34.90 ENCOUNTER FOR INDUCTION OF LABOR: Status: ACTIVE | Noted: 2024-02-15

## 2024-02-15 LAB
BASOPHILS # BLD: 0 K/UL (ref 0–0.1)
BASOPHILS NFR BLD: 0 % (ref 0–1)
DIFFERENTIAL METHOD BLD: ABNORMAL
EOSINOPHIL # BLD: 0.4 K/UL (ref 0–0.4)
EOSINOPHIL NFR BLD: 3 % (ref 0–7)
ERYTHROCYTE [DISTWIDTH] IN BLOOD BY AUTOMATED COUNT: 13.2 % (ref 11.5–14.5)
HCT VFR BLD AUTO: 31.5 % (ref 35–47)
HGB BLD-MCNC: 10.9 G/DL (ref 11.5–16)
IMM GRANULOCYTES # BLD AUTO: 0 K/UL (ref 0–0.04)
IMM GRANULOCYTES NFR BLD AUTO: 0 % (ref 0–0.5)
LYMPHOCYTES # BLD: 2.3 K/UL (ref 0.8–3.5)
LYMPHOCYTES NFR BLD: 20 % (ref 12–49)
MCH RBC QN AUTO: 27.7 PG (ref 26–34)
MCHC RBC AUTO-ENTMCNC: 34.6 G/DL (ref 30–36.5)
MCV RBC AUTO: 80.2 FL (ref 80–99)
MONOCYTES # BLD: 0.7 K/UL (ref 0–1)
MONOCYTES NFR BLD: 6 % (ref 5–13)
NEUTS SEG # BLD: 8.1 K/UL (ref 1.8–8)
NEUTS SEG NFR BLD: 71 % (ref 32–75)
NRBC # BLD: 0 K/UL (ref 0–0.01)
NRBC BLD-RTO: 0 PER 100 WBC
PLATELET # BLD AUTO: 293 K/UL (ref 150–400)
PMV BLD AUTO: 10.1 FL (ref 8.9–12.9)
RBC # BLD AUTO: 3.93 M/UL (ref 3.8–5.2)
WBC # BLD AUTO: 11.5 K/UL (ref 3.6–11)

## 2024-02-15 PROCEDURE — 86900 BLOOD TYPING SEROLOGIC ABO: CPT

## 2024-02-15 PROCEDURE — 85025 COMPLETE CBC W/AUTO DIFF WBC: CPT

## 2024-02-15 PROCEDURE — 86901 BLOOD TYPING SEROLOGIC RH(D): CPT

## 2024-02-15 PROCEDURE — 2580000003 HC RX 258: Performed by: ADVANCED PRACTICE MIDWIFE

## 2024-02-15 PROCEDURE — 86850 RBC ANTIBODY SCREEN: CPT

## 2024-02-15 PROCEDURE — 6360000002 HC RX W HCPCS: Performed by: ADVANCED PRACTICE MIDWIFE

## 2024-02-15 PROCEDURE — 7210000100 HC LABOR FEE PER 1 HR: Performed by: STUDENT IN AN ORGANIZED HEALTH CARE EDUCATION/TRAINING PROGRAM

## 2024-02-15 PROCEDURE — 6370000000 HC RX 637 (ALT 250 FOR IP): Performed by: ADVANCED PRACTICE MIDWIFE

## 2024-02-15 PROCEDURE — 86780 TREPONEMA PALLIDUM: CPT

## 2024-02-15 PROCEDURE — 59200 INSERT CERVICAL DILATOR: CPT | Performed by: STUDENT IN AN ORGANIZED HEALTH CARE EDUCATION/TRAINING PROGRAM

## 2024-02-15 PROCEDURE — 36415 COLL VENOUS BLD VENIPUNCTURE: CPT

## 2024-02-15 PROCEDURE — 1100000000 HC RM PRIVATE

## 2024-02-15 RX ORDER — PROCHLORPERAZINE EDISYLATE 5 MG/ML
10 INJECTION INTRAMUSCULAR; INTRAVENOUS EVERY 6 HOURS PRN
Status: DISCONTINUED | OUTPATIENT
Start: 2024-02-15 | End: 2024-02-17 | Stop reason: HOSPADM

## 2024-02-15 RX ORDER — CARBOPROST TROMETHAMINE 250 UG/ML
250 INJECTION, SOLUTION INTRAMUSCULAR PRN
Status: DISCONTINUED | OUTPATIENT
Start: 2024-02-15 | End: 2024-02-16

## 2024-02-15 RX ORDER — SODIUM CHLORIDE, SODIUM LACTATE, POTASSIUM CHLORIDE, AND CALCIUM CHLORIDE .6; .31; .03; .02 G/100ML; G/100ML; G/100ML; G/100ML
500 INJECTION, SOLUTION INTRAVENOUS PRN
Status: DISCONTINUED | OUTPATIENT
Start: 2024-02-15 | End: 2024-02-16

## 2024-02-15 RX ORDER — SODIUM CHLORIDE, SODIUM LACTATE, POTASSIUM CHLORIDE, CALCIUM CHLORIDE 600; 310; 30; 20 MG/100ML; MG/100ML; MG/100ML; MG/100ML
INJECTION, SOLUTION INTRAVENOUS CONTINUOUS
Status: DISCONTINUED | OUTPATIENT
Start: 2024-02-15 | End: 2024-02-16

## 2024-02-15 RX ORDER — ONDANSETRON 2 MG/ML
4 INJECTION INTRAMUSCULAR; INTRAVENOUS EVERY 6 HOURS PRN
Status: DISCONTINUED | OUTPATIENT
Start: 2024-02-15 | End: 2024-02-16

## 2024-02-15 RX ORDER — MONTELUKAST SODIUM 10 MG/1
10 TABLET ORAL NIGHTLY
Status: DISCONTINUED | OUTPATIENT
Start: 2024-02-15 | End: 2024-02-17 | Stop reason: HOSPADM

## 2024-02-15 RX ORDER — FAMOTIDINE 20 MG/1
40 TABLET, FILM COATED ORAL 2 TIMES DAILY
Status: DISCONTINUED | OUTPATIENT
Start: 2024-02-15 | End: 2024-02-16

## 2024-02-15 RX ORDER — SODIUM CHLORIDE 0.9 % (FLUSH) 0.9 %
5-40 SYRINGE (ML) INJECTION EVERY 12 HOURS SCHEDULED
Status: DISCONTINUED | OUTPATIENT
Start: 2024-02-15 | End: 2024-02-16

## 2024-02-15 RX ORDER — DIPHENHYDRAMINE HYDROCHLORIDE 50 MG/ML
50 INJECTION INTRAMUSCULAR; INTRAVENOUS NIGHTLY PRN
Status: DISCONTINUED | OUTPATIENT
Start: 2024-02-15 | End: 2024-02-16

## 2024-02-15 RX ORDER — MISOPROSTOL 200 UG/1
800 TABLET ORAL PRN
Status: DISCONTINUED | OUTPATIENT
Start: 2024-02-15 | End: 2024-02-16

## 2024-02-15 RX ORDER — METHYLERGONOVINE MALEATE 0.2 MG/ML
200 INJECTION INTRAVENOUS PRN
Status: DISCONTINUED | OUTPATIENT
Start: 2024-02-15 | End: 2024-02-16

## 2024-02-15 RX ORDER — DOCUSATE SODIUM 100 MG/1
100 CAPSULE, LIQUID FILLED ORAL 2 TIMES DAILY
Status: DISCONTINUED | OUTPATIENT
Start: 2024-02-15 | End: 2024-02-16

## 2024-02-15 RX ORDER — SODIUM CHLORIDE, SODIUM LACTATE, POTASSIUM CHLORIDE, AND CALCIUM CHLORIDE .6; .31; .03; .02 G/100ML; G/100ML; G/100ML; G/100ML
1000 INJECTION, SOLUTION INTRAVENOUS PRN
Status: DISCONTINUED | OUTPATIENT
Start: 2024-02-15 | End: 2024-02-16

## 2024-02-15 RX ORDER — CETIRIZINE HYDROCHLORIDE 10 MG/1
10 TABLET ORAL NIGHTLY
Status: DISCONTINUED | OUTPATIENT
Start: 2024-02-16 | End: 2024-02-17 | Stop reason: HOSPADM

## 2024-02-15 RX ORDER — FAMOTIDINE 40 MG/1
40 TABLET, FILM COATED ORAL DAILY
COMMUNITY

## 2024-02-15 RX ORDER — VALACYCLOVIR HYDROCHLORIDE 500 MG/1
500 TABLET, FILM COATED ORAL 2 TIMES DAILY
Status: DISCONTINUED | OUTPATIENT
Start: 2024-02-15 | End: 2024-02-17 | Stop reason: HOSPADM

## 2024-02-15 RX ORDER — ACETAMINOPHEN 325 MG/1
650 TABLET ORAL EVERY 4 HOURS PRN
Status: DISCONTINUED | OUTPATIENT
Start: 2024-02-15 | End: 2024-02-16

## 2024-02-15 RX ORDER — SODIUM CHLORIDE 9 MG/ML
25 INJECTION, SOLUTION INTRAVENOUS PRN
Status: DISCONTINUED | OUTPATIENT
Start: 2024-02-15 | End: 2024-02-16

## 2024-02-15 RX ORDER — TRANEXAMIC ACID 10 MG/ML
1000 INJECTION, SOLUTION INTRAVENOUS
Status: DISCONTINUED | OUTPATIENT
Start: 2024-02-15 | End: 2024-02-16

## 2024-02-15 RX ORDER — CETIRIZINE HYDROCHLORIDE 10 MG/1
5 TABLET ORAL NIGHTLY
Status: DISCONTINUED | OUTPATIENT
Start: 2024-02-15 | End: 2024-02-15

## 2024-02-15 RX ORDER — HYDROMORPHONE HYDROCHLORIDE 1 MG/ML
1 INJECTION, SOLUTION INTRAMUSCULAR; INTRAVENOUS; SUBCUTANEOUS
Status: DISCONTINUED | OUTPATIENT
Start: 2024-02-16 | End: 2024-02-17 | Stop reason: HOSPADM

## 2024-02-15 RX ORDER — SODIUM CHLORIDE 0.9 % (FLUSH) 0.9 %
5-40 SYRINGE (ML) INJECTION PRN
Status: DISCONTINUED | OUTPATIENT
Start: 2024-02-15 | End: 2024-02-16

## 2024-02-15 RX ORDER — CETIRIZINE HYDROCHLORIDE 5 MG/1
10 TABLET ORAL DAILY
COMMUNITY

## 2024-02-15 RX ADMIN — DIPHENHYDRAMINE HYDROCHLORIDE 50 MG: 50 INJECTION INTRAMUSCULAR; INTRAVENOUS at 23:24

## 2024-02-15 RX ADMIN — Medication 25 MCG: at 23:23

## 2024-02-15 RX ADMIN — WATER 2000 MG: 1 INJECTION INTRAMUSCULAR; INTRAVENOUS; SUBCUTANEOUS at 23:24

## 2024-02-15 RX ADMIN — MONTELUKAST 10 MG: 10 TABLET, FILM COATED ORAL at 23:26

## 2024-02-15 RX ADMIN — FAMOTIDINE 40 MG: 20 TABLET ORAL at 23:24

## 2024-02-15 RX ADMIN — CETIRIZINE HYDROCHLORIDE 10 MG: 10 TABLET, FILM COATED ORAL at 23:24

## 2024-02-15 RX ADMIN — SODIUM CHLORIDE, POTASSIUM CHLORIDE, SODIUM LACTATE AND CALCIUM CHLORIDE 1000 ML: 600; 310; 30; 20 INJECTION, SOLUTION INTRAVENOUS at 23:38

## 2024-02-15 RX ADMIN — VALACYCLOVIR HYDROCHLORIDE 500 MG: 500 TABLET, FILM COATED ORAL at 23:24

## 2024-02-15 SDOH — SOCIAL STABILITY: SOCIAL NETWORK: HOW OFTEN DO YOU GET TOGETHER WITH FRIENDS OR RELATIVES?: MORE THAN THREE TIMES A WEEK

## 2024-02-15 SDOH — ECONOMIC STABILITY: TRANSPORTATION INSECURITY
IN THE PAST 12 MONTHS, HAS THE LACK OF TRANSPORTATION KEPT YOU FROM MEDICAL APPOINTMENTS OR FROM GETTING MEDICATIONS?: NO

## 2024-02-15 SDOH — SOCIAL STABILITY: SOCIAL NETWORK: HOW OFTEN DO YOU ATTENT MEETINGS OF THE CLUB OR ORGANIZATION YOU BELONG TO?: NEVER

## 2024-02-15 SDOH — HEALTH STABILITY: MENTAL HEALTH
STRESS IS WHEN SOMEONE FEELS TENSE, NERVOUS, ANXIOUS, OR CAN'T SLEEP AT NIGHT BECAUSE THEIR MIND IS TROUBLED. HOW STRESSED ARE YOU?: TO SOME EXTENT

## 2024-02-15 SDOH — SOCIAL STABILITY: SOCIAL INSECURITY: WITHIN THE LAST YEAR, HAVE YOU BEEN AFRAID OF YOUR PARTNER OR EX-PARTNER?: NO

## 2024-02-15 SDOH — SOCIAL STABILITY: SOCIAL NETWORK: HOW OFTEN DO YOU ATTEND CHURCH OR RELIGIOUS SERVICES?: NEVER

## 2024-02-15 SDOH — SOCIAL STABILITY: SOCIAL INSECURITY
WITHIN THE LAST YEAR, HAVE YOU BEEN KICKED, HIT, SLAPPED, OR OTHERWISE PHYSICALLY HURT BY YOUR PARTNER OR EX-PARTNER?: NO

## 2024-02-15 SDOH — ECONOMIC STABILITY: HOUSING INSECURITY
IN THE LAST 12 MONTHS, WAS THERE A TIME WHEN YOU DID NOT HAVE A STEADY PLACE TO SLEEP OR SLEPT IN A SHELTER (INCLUDING NOW)?: NO

## 2024-02-15 SDOH — SOCIAL STABILITY: SOCIAL INSECURITY
WITHIN THE LAST YEAR, HAVE TO BEEN RAPED OR FORCED TO HAVE ANY KIND OF SEXUAL ACTIVITY BY YOUR PARTNER OR EX-PARTNER?: NO

## 2024-02-15 SDOH — HEALTH STABILITY: PHYSICAL HEALTH: ON AVERAGE, HOW MANY MINUTES DO YOU ENGAGE IN EXERCISE AT THIS LEVEL?: 30 MIN

## 2024-02-15 SDOH — SOCIAL STABILITY: SOCIAL NETWORK
IN A TYPICAL WEEK, HOW MANY TIMES DO YOU TALK ON THE PHONE WITH FAMILY, FRIENDS, OR NEIGHBORS?: MORE THAN THREE TIMES A WEEK

## 2024-02-15 SDOH — SOCIAL STABILITY: SOCIAL NETWORK: ARE YOU MARRIED, WIDOWED, DIVORCED, SEPARATED, NEVER MARRIED, OR LIVING WITH A PARTNER?: MARRIED

## 2024-02-15 SDOH — HEALTH STABILITY: PHYSICAL HEALTH: ON AVERAGE, HOW MANY DAYS PER WEEK DO YOU ENGAGE IN MODERATE TO STRENUOUS EXERCISE (LIKE A BRISK WALK)?: 3 DAYS

## 2024-02-15 SDOH — ECONOMIC STABILITY: HOUSING INSECURITY: IN THE LAST 12 MONTHS, HOW MANY PLACES HAVE YOU LIVED?: 2

## 2024-02-15 SDOH — ECONOMIC STABILITY: FOOD INSECURITY: WITHIN THE PAST 12 MONTHS, YOU WORRIED THAT YOUR FOOD WOULD RUN OUT BEFORE YOU GOT MONEY TO BUY MORE.: NEVER TRUE

## 2024-02-15 SDOH — ECONOMIC STABILITY: INCOME INSECURITY: HOW HARD IS IT FOR YOU TO PAY FOR THE VERY BASICS LIKE FOOD, HOUSING, MEDICAL CARE, AND HEATING?: NOT HARD AT ALL

## 2024-02-15 SDOH — ECONOMIC STABILITY: FOOD INSECURITY: WITHIN THE PAST 12 MONTHS, THE FOOD YOU BOUGHT JUST DIDN'T LAST AND YOU DIDN'T HAVE MONEY TO GET MORE.: NEVER TRUE

## 2024-02-15 SDOH — SOCIAL STABILITY: SOCIAL NETWORK
DO YOU BELONG TO ANY CLUBS OR ORGANIZATIONS SUCH AS CHURCH GROUPS UNIONS, FRATERNAL OR ATHLETIC GROUPS, OR SCHOOL GROUPS?: NO

## 2024-02-15 SDOH — ECONOMIC STABILITY: INCOME INSECURITY: IN THE LAST 12 MONTHS, WAS THERE A TIME WHEN YOU WERE NOT ABLE TO PAY THE MORTGAGE OR RENT ON TIME?: NO

## 2024-02-15 SDOH — HEALTH STABILITY: MENTAL HEALTH: HOW MANY STANDARD DRINKS CONTAINING ALCOHOL DO YOU HAVE ON A TYPICAL DAY?: PATIENT DOES NOT DRINK

## 2024-02-15 SDOH — ECONOMIC STABILITY: TRANSPORTATION INSECURITY
IN THE PAST 12 MONTHS, HAS LACK OF TRANSPORTATION KEPT YOU FROM MEETINGS, WORK, OR FROM GETTING THINGS NEEDED FOR DAILY LIVING?: NO

## 2024-02-15 SDOH — SOCIAL STABILITY: SOCIAL INSECURITY: WITHIN THE LAST YEAR, HAVE YOU BEEN HUMILIATED OR EMOTIONALLY ABUSED IN OTHER WAYS BY YOUR PARTNER OR EX-PARTNER?: NO

## 2024-02-15 SDOH — HEALTH STABILITY: MENTAL HEALTH: HOW OFTEN DO YOU HAVE A DRINK CONTAINING ALCOHOL?: NEVER

## 2024-02-16 ENCOUNTER — ANESTHESIA (OUTPATIENT)
Facility: HOSPITAL | Age: 29
End: 2024-02-16
Payer: COMMERCIAL

## 2024-02-16 ENCOUNTER — ANESTHESIA EVENT (OUTPATIENT)
Facility: HOSPITAL | Age: 29
End: 2024-02-16
Payer: COMMERCIAL

## 2024-02-16 LAB
ABO + RH BLD: NORMAL
BLOOD GROUP ANTIBODIES SERPL: NORMAL
SPECIMEN EXP DATE BLD: NORMAL

## 2024-02-16 PROCEDURE — 6370000000 HC RX 637 (ALT 250 FOR IP): Performed by: ADVANCED PRACTICE MIDWIFE

## 2024-02-16 PROCEDURE — 2500000003 HC RX 250 WO HCPCS: Performed by: NURSE ANESTHETIST, CERTIFIED REGISTERED

## 2024-02-16 PROCEDURE — 00HU33Z INSERTION OF INFUSION DEVICE INTO SPINAL CANAL, PERCUTANEOUS APPROACH: ICD-10-PCS | Performed by: ANESTHESIOLOGY

## 2024-02-16 PROCEDURE — 3700000156 HC EPIDURAL ANESTHESIA: Performed by: NURSE ANESTHETIST, CERTIFIED REGISTERED

## 2024-02-16 PROCEDURE — 1120000000 HC RM PRIVATE OB

## 2024-02-16 PROCEDURE — 7220000101 HC DELIVERY VAGINAL/SINGLE: Performed by: STUDENT IN AN ORGANIZED HEALTH CARE EDUCATION/TRAINING PROGRAM

## 2024-02-16 PROCEDURE — 7210000100 HC LABOR FEE PER 1 HR: Performed by: STUDENT IN AN ORGANIZED HEALTH CARE EDUCATION/TRAINING PROGRAM

## 2024-02-16 PROCEDURE — 6370000000 HC RX 637 (ALT 250 FOR IP): Performed by: STUDENT IN AN ORGANIZED HEALTH CARE EDUCATION/TRAINING PROGRAM

## 2024-02-16 PROCEDURE — 2580000003 HC RX 258: Performed by: STUDENT IN AN ORGANIZED HEALTH CARE EDUCATION/TRAINING PROGRAM

## 2024-02-16 PROCEDURE — 6360000002 HC RX W HCPCS: Performed by: ADVANCED PRACTICE MIDWIFE

## 2024-02-16 PROCEDURE — 51702 INSERT TEMP BLADDER CATH: CPT

## 2024-02-16 PROCEDURE — 10907ZC DRAINAGE OF AMNIOTIC FLUID, THERAPEUTIC FROM PRODUCTS OF CONCEPTION, VIA NATURAL OR ARTIFICIAL OPENING: ICD-10-PCS | Performed by: STUDENT IN AN ORGANIZED HEALTH CARE EDUCATION/TRAINING PROGRAM

## 2024-02-16 PROCEDURE — 2500000003 HC RX 250 WO HCPCS: Performed by: ADVANCED PRACTICE MIDWIFE

## 2024-02-16 PROCEDURE — 6360000002 HC RX W HCPCS: Performed by: NURSE ANESTHETIST, CERTIFIED REGISTERED

## 2024-02-16 PROCEDURE — 3700000025 EPIDURAL BLOCK: Performed by: ANESTHESIOLOGY

## 2024-02-16 PROCEDURE — 2580000003 HC RX 258: Performed by: ADVANCED PRACTICE MIDWIFE

## 2024-02-16 RX ORDER — MISOPROSTOL 200 UG/1
800 TABLET ORAL PRN
Status: DISCONTINUED | OUTPATIENT
Start: 2024-02-16 | End: 2024-02-17 | Stop reason: HOSPADM

## 2024-02-16 RX ORDER — LIDOCAINE HYDROCHLORIDE AND EPINEPHRINE 15; 5 MG/ML; UG/ML
INJECTION, SOLUTION EPIDURAL PRN
Status: DISCONTINUED | OUTPATIENT
Start: 2024-02-16 | End: 2024-02-16 | Stop reason: SDUPTHER

## 2024-02-16 RX ORDER — FAMOTIDINE 20 MG/1
40 TABLET, FILM COATED ORAL NIGHTLY
Status: DISCONTINUED | OUTPATIENT
Start: 2024-02-16 | End: 2024-02-17 | Stop reason: HOSPADM

## 2024-02-16 RX ORDER — SIMETHICONE 80 MG
80 TABLET,CHEWABLE ORAL EVERY 6 HOURS PRN
Status: DISCONTINUED | OUTPATIENT
Start: 2024-02-16 | End: 2024-02-17 | Stop reason: HOSPADM

## 2024-02-16 RX ORDER — SODIUM CHLORIDE, SODIUM LACTATE, POTASSIUM CHLORIDE, CALCIUM CHLORIDE 600; 310; 30; 20 MG/100ML; MG/100ML; MG/100ML; MG/100ML
INJECTION, SOLUTION INTRAVENOUS CONTINUOUS
Status: DISCONTINUED | OUTPATIENT
Start: 2024-02-16 | End: 2024-02-17 | Stop reason: HOSPADM

## 2024-02-16 RX ORDER — EPHEDRINE SULFATE/0.9% NACL/PF 50 MG/5 ML
10 SYRINGE (ML) INTRAVENOUS ONCE
Status: DISCONTINUED | OUTPATIENT
Start: 2024-02-16 | End: 2024-02-16

## 2024-02-16 RX ORDER — SODIUM CHLORIDE 0.9 % (FLUSH) 0.9 %
5-40 SYRINGE (ML) INJECTION EVERY 12 HOURS SCHEDULED
Status: DISCONTINUED | OUTPATIENT
Start: 2024-02-16 | End: 2024-02-16

## 2024-02-16 RX ORDER — TRANEXAMIC ACID 10 MG/ML
1000 INJECTION, SOLUTION INTRAVENOUS
Status: ACTIVE | OUTPATIENT
Start: 2024-02-16 | End: 2024-02-17

## 2024-02-16 RX ORDER — FAMOTIDINE 20 MG/1
20 TABLET, FILM COATED ORAL 2 TIMES DAILY
Status: DISCONTINUED | OUTPATIENT
Start: 2024-02-16 | End: 2024-02-17 | Stop reason: HOSPADM

## 2024-02-16 RX ORDER — SODIUM CHLORIDE 0.9 % (FLUSH) 0.9 %
5-40 SYRINGE (ML) INJECTION PRN
Status: DISCONTINUED | OUTPATIENT
Start: 2024-02-16 | End: 2024-02-17 | Stop reason: HOSPADM

## 2024-02-16 RX ORDER — METHYLERGONOVINE MALEATE 0.2 MG/ML
200 INJECTION INTRAVENOUS PRN
Status: DISCONTINUED | OUTPATIENT
Start: 2024-02-16 | End: 2024-02-17 | Stop reason: HOSPADM

## 2024-02-16 RX ORDER — SODIUM CHLORIDE, SODIUM LACTATE, POTASSIUM CHLORIDE, AND CALCIUM CHLORIDE .6; .31; .03; .02 G/100ML; G/100ML; G/100ML; G/100ML
500 INJECTION, SOLUTION INTRAVENOUS ONCE
Status: DISCONTINUED | OUTPATIENT
Start: 2024-02-16 | End: 2024-02-16

## 2024-02-16 RX ORDER — ACETAMINOPHEN 500 MG
1000 TABLET ORAL EVERY 8 HOURS SCHEDULED
Status: DISCONTINUED | OUTPATIENT
Start: 2024-02-16 | End: 2024-02-17 | Stop reason: HOSPADM

## 2024-02-16 RX ORDER — CARBOPROST TROMETHAMINE 250 UG/ML
250 INJECTION, SOLUTION INTRAMUSCULAR
Status: ACTIVE | OUTPATIENT
Start: 2024-02-16 | End: 2024-02-17

## 2024-02-16 RX ORDER — ONDANSETRON 2 MG/ML
4 INJECTION INTRAMUSCULAR; INTRAVENOUS EVERY 6 HOURS PRN
Status: DISCONTINUED | OUTPATIENT
Start: 2024-02-16 | End: 2024-02-16

## 2024-02-16 RX ORDER — DOCUSATE SODIUM 100 MG/1
100 CAPSULE, LIQUID FILLED ORAL 2 TIMES DAILY
Status: DISCONTINUED | OUTPATIENT
Start: 2024-02-16 | End: 2024-02-17 | Stop reason: HOSPADM

## 2024-02-16 RX ORDER — IBUPROFEN 800 MG/1
800 TABLET ORAL EVERY 8 HOURS PRN
Status: DISCONTINUED | OUTPATIENT
Start: 2024-02-16 | End: 2024-02-17 | Stop reason: HOSPADM

## 2024-02-16 RX ORDER — POLYETHYLENE GLYCOL 3350 17 G/17G
17 POWDER, FOR SOLUTION ORAL DAILY
Status: DISCONTINUED | OUTPATIENT
Start: 2024-02-16 | End: 2024-02-17 | Stop reason: HOSPADM

## 2024-02-16 RX ORDER — SODIUM CHLORIDE, SODIUM LACTATE, POTASSIUM CHLORIDE, AND CALCIUM CHLORIDE .6; .31; .03; .02 G/100ML; G/100ML; G/100ML; G/100ML
1000 INJECTION, SOLUTION INTRAVENOUS ONCE
Status: COMPLETED | OUTPATIENT
Start: 2024-02-16 | End: 2024-02-16

## 2024-02-16 RX ORDER — NALOXONE HYDROCHLORIDE 0.4 MG/ML
INJECTION, SOLUTION INTRAMUSCULAR; INTRAVENOUS; SUBCUTANEOUS PRN
Status: DISCONTINUED | OUTPATIENT
Start: 2024-02-16 | End: 2024-02-16

## 2024-02-16 RX ORDER — LANOLIN/MINERAL OIL
LOTION (ML) TOPICAL PRN
Status: DISCONTINUED | OUTPATIENT
Start: 2024-02-16 | End: 2024-02-17 | Stop reason: HOSPADM

## 2024-02-16 RX ORDER — BUPIVACAINE HYDROCHLORIDE 2.5 MG/ML
INJECTION, SOLUTION EPIDURAL; INFILTRATION; INTRACAUDAL PRN
Status: DISCONTINUED | OUTPATIENT
Start: 2024-02-16 | End: 2024-02-16 | Stop reason: SDUPTHER

## 2024-02-16 RX ORDER — SODIUM CHLORIDE, SODIUM LACTATE, POTASSIUM CHLORIDE, CALCIUM CHLORIDE 600; 310; 30; 20 MG/100ML; MG/100ML; MG/100ML; MG/100ML
INJECTION, SOLUTION INTRAVENOUS CONTINUOUS
Status: DISCONTINUED | OUTPATIENT
Start: 2024-02-16 | End: 2024-02-16

## 2024-02-16 RX ORDER — SODIUM CHLORIDE 9 MG/ML
INJECTION, SOLUTION INTRAVENOUS PRN
Status: DISCONTINUED | OUTPATIENT
Start: 2024-02-16 | End: 2024-02-17 | Stop reason: HOSPADM

## 2024-02-16 RX ORDER — FENTANYL/BUPIVACAINE/NS/PF 2-1250MCG
10 PLASTIC BAG, INJECTION (ML) INJECTION CONTINUOUS
Status: DISCONTINUED | OUTPATIENT
Start: 2024-02-16 | End: 2024-02-16

## 2024-02-16 RX ORDER — ONDANSETRON 4 MG/1
8 TABLET, ORALLY DISINTEGRATING ORAL EVERY 8 HOURS PRN
Status: DISCONTINUED | OUTPATIENT
Start: 2024-02-16 | End: 2024-02-17 | Stop reason: HOSPADM

## 2024-02-16 RX ADMIN — SODIUM CHLORIDE, POTASSIUM CHLORIDE, SODIUM LACTATE AND CALCIUM CHLORIDE: 600; 310; 30; 20 INJECTION, SOLUTION INTRAVENOUS at 08:38

## 2024-02-16 RX ADMIN — Medication 10 ML/HR: at 08:17

## 2024-02-16 RX ADMIN — ACETAMINOPHEN 1000 MG: 500 TABLET ORAL at 23:15

## 2024-02-16 RX ADMIN — BUPIVACAINE HYDROCHLORIDE 3 ML: 2.5 INJECTION, SOLUTION EPIDURAL; INFILTRATION; INTRACAUDAL; PERINEURAL at 07:55

## 2024-02-16 RX ADMIN — SODIUM CHLORIDE, POTASSIUM CHLORIDE, SODIUM LACTATE AND CALCIUM CHLORIDE 1000 ML: 600; 310; 30; 20 INJECTION, SOLUTION INTRAVENOUS at 06:26

## 2024-02-16 RX ADMIN — WATER 1000 MG: 1 INJECTION INTRAMUSCULAR; INTRAVENOUS; SUBCUTANEOUS at 06:32

## 2024-02-16 RX ADMIN — IBUPROFEN 800 MG: 800 TABLET ORAL at 18:29

## 2024-02-16 RX ADMIN — Medication 50 MCG: at 03:59

## 2024-02-16 RX ADMIN — ACETAMINOPHEN 1000 MG: 500 TABLET ORAL at 14:55

## 2024-02-16 RX ADMIN — Medication 25 MCG: at 01:50

## 2024-02-16 RX ADMIN — LIDOCAINE HYDROCHLORIDE AND EPINEPHRINE 3 ML: 15; 5 INJECTION, SOLUTION EPIDURAL at 07:50

## 2024-02-16 RX ADMIN — Medication 166.7 ML: at 13:14

## 2024-02-16 RX ADMIN — LIDOCAINE HYDROCHLORIDE AND EPINEPHRINE 2 ML: 15; 5 INJECTION, SOLUTION EPIDURAL at 07:55

## 2024-02-16 RX ADMIN — VALACYCLOVIR HYDROCHLORIDE 500 MG: 500 TABLET, FILM COATED ORAL at 08:39

## 2024-02-16 RX ADMIN — OXYTOCIN 87.3 MILLI-UNITS/MIN: 10 INJECTION, SOLUTION INTRAMUSCULAR; INTRAVENOUS at 13:13

## 2024-02-16 RX ADMIN — DOCUSATE SODIUM 100 MG: 100 CAPSULE, LIQUID FILLED ORAL at 18:29

## 2024-02-16 RX ADMIN — SODIUM CHLORIDE, POTASSIUM CHLORIDE, SODIUM LACTATE AND CALCIUM CHLORIDE: 600; 310; 30; 20 INJECTION, SOLUTION INTRAVENOUS at 13:48

## 2024-02-16 RX ADMIN — HYDROMORPHONE HYDROCHLORIDE 1 MG: 1 INJECTION, SOLUTION INTRAMUSCULAR; INTRAVENOUS; SUBCUTANEOUS at 06:24

## 2024-02-16 ASSESSMENT — PAIN DESCRIPTION - DESCRIPTORS
DESCRIPTORS: CRAMPING
DESCRIPTORS: ACHING;SORE

## 2024-02-16 ASSESSMENT — PAIN SCALES - GENERAL
PAINLEVEL_OUTOF10: 3
PAINLEVEL_OUTOF10: 10
PAINLEVEL_OUTOF10: 4
PAINLEVEL_OUTOF10: 2

## 2024-02-16 ASSESSMENT — PAIN DESCRIPTION - LOCATION
LOCATION: BACK
LOCATION: BACK;PERINEUM

## 2024-02-16 ASSESSMENT — PAIN DESCRIPTION - ORIENTATION
ORIENTATION: LOWER
ORIENTATION: LOWER

## 2024-02-16 NOTE — PROGRESS NOTES
CNM Labor Progress Note     Patient: Agustina Hubbard MRN: [id number]  SSN: [id number]    YOB: 1995  Age: 28 y.o.  Sex: female        Subjective:   Patient coping well with contractions.       Objective:   Blood pressure (!) 106/53, pulse 76, temperature 98.1 °F (36.7 °C), resp. rate 16, height 1.6 m (5' 3\"), weight 77.6 kg (171 lb), SpO2 93 %.    Fetal heart baseline 140, moderate variability, accelerations present, decelerations not present, Uterine contractions q 1-3 minutes, moderate to palpation, resting tone soft.    Sterile Vaginal Exam: 2 cm dilated/ 50 % effaced/ -3 station, cervix midline , fetal presentation vertex, membranes ruptured       Assessment:     38w4d  Category 1 fetal heart rate tracing   Labor   SROM - clear fluid at 06:20am   GBS: Positive -> Allergic to PCN and has sensitivities to Clindamycin.  Pt to receive Ancef 2 g IV now and then 1 mg q 8 hours.    Hx of Asthma - takes singular 10 mg and albuterol inhaler daily. Pt brought her inhaler with her.   Fetal IUGR.  Hx of HSV - takes Valtrex 1 gram daily     Plan:   Continue current orders/management   No cervical cook catheter to be placed today due to SROM.   CNM management until 8am  Anticipate CHRISTIANE Garcia - FERNANDO

## 2024-02-16 NOTE — PROGRESS NOTES
L&D Progress Note    Admit Date: 2/15/2024      Subjective:     Patient has no complaint.  Notified by nursing and primary ob of some vaginal bleeding, requesting SVE.        Objective:     Patient Vitals for the past 8 hrs:   BP Temp Temp src Pulse Resp SpO2   02/16/24 0947 -- -- -- -- -- 97 %   02/16/24 0942 -- -- -- 76 -- 93 %   02/16/24 0941 117/66 -- -- 71 -- --   02/16/24 0927 -- -- -- -- -- 98 %   02/16/24 0925 116/65 -- -- 83 -- --   02/16/24 0912 106/60 -- -- 79 -- 98 %   02/16/24 0840 125/63 -- -- 97 -- --   02/16/24 0839 -- -- -- 85 -- 93 %   02/16/24 0837 (!) 110/56 -- -- 69 -- 96 %   02/16/24 0834 (!) 113/58 -- -- 71 -- --   02/16/24 0832 -- -- -- 74 -- 95 %   02/16/24 0831 (!) 113/57 -- -- 71 -- --   02/16/24 0828 121/62 -- -- 94 -- --   02/16/24 0827 -- -- -- 83 -- 98 %   02/16/24 0825 120/63 -- -- 83 -- --   02/16/24 0822 123/64 -- -- 81 -- 98 %   02/16/24 0819 118/62 -- -- 78 -- --   02/16/24 0817 -- -- -- 87 -- 98 %   02/16/24 0816 126/64 -- -- 82 -- --   02/16/24 0813 121/69 -- -- 88 -- --   02/16/24 0812 -- -- -- 81 -- 99 %   02/16/24 0810 123/70 -- -- 86 -- --   02/16/24 0807 119/72 -- -- 83 -- 99 %   02/16/24 0804 122/74 -- -- 84 -- --   02/16/24 0802 -- -- -- 79 -- 99 %   02/16/24 0801 116/74 -- -- 85 -- --   02/16/24 0758 121/80 -- -- 81 -- --   02/16/24 0757 -- -- -- 74 -- 97 %   02/16/24 0755 127/79 -- -- 97 -- --   02/16/24 0752 124/69 -- -- 75 -- 99 %   02/16/24 0749 131/72 -- -- 76 -- --   02/16/24 0747 -- -- -- -- -- 99 %   02/16/24 0746 129/71 -- -- 74 -- --   02/16/24 0743 (!) 149/81 -- -- 75 -- --   02/16/24 0742 -- -- -- 83 -- 98 %   02/16/24 0741 -- -- -- 93 -- 91 %   02/16/24 0717 130/72 98 °F (36.7 °C) Oral 83 18 98 %   02/16/24 0400 (!) 106/53 98.1 °F (36.7 °C) -- 76 16 93 %     No intake/output data recorded.  No intake/output data recorded.    Current Facility-Administered Medications   Medication Dose Route Frequency    famotidine (PEPCID) tablet 40 mg  40 mg Oral Nightly

## 2024-02-16 NOTE — L&D DELIVERY NOTE
Neil Hubbard [[id number]]      Labor Events      Cervical Ripening Date/Time:        Rupture Date/Time:  24 06:10:00   Rupture Type: AROM, Intact  Fluid Color: Clear, Bloody Show  Fluid Odor: None              Delivery Details      Delivery Date: 24 Delivery Time: 13:10:00                 Cord                  Placenta           Lacerations           Blood Loss  Mother: Agustina Hubbard #[id number]     Start of Mother's Information      Delivery Blood Loss  24 0110 - 24 1323      None                 End of Mother's Information  Mother: Agustina Hubbard #[id number]                Delivery Providers    Delivering clinician:              Cary Assessment          Skin Color:   Heart Rate:   Reflex Irritability:   Muscle Tone:   Respiratory Effort:   Total:            1 Minute:         5 Minute:                                                  Measurements                     Delivery Note:     Called to LDR because patient was found to be c/c/+2. Patient pushed effectively and fetal head was delivered over perineum. No nuchal.  The anterior shoulder followed by the posterior shoulder and body were subsequently delivered. The infant was placed on maternal abdomen.  The cord was then clamped and cut after 1 minute of pulsation.  Cord blood was taken.  The placenta delivered spontaneously, intact, with 3VC.  Pitocin was added to the IVF and the fundus was firm to palpation.  The vagina, cervix and perineum were examined and no laceration was found.  EBL 50 mL.  No complications.  Mom and baby doing well.  Dr. Cronin delivering.     Anne-Marie Cronin MD  [facility]

## 2024-02-16 NOTE — ANESTHESIA PROCEDURE NOTES
Epidural Block    Patient location during procedure: OB  Start time: 2/16/2024 7:43 AM  End time: 2/16/2024 7:56 AM  Reason for block: labor epidural  Staffing  Performed: resident/CRNA   Anesthesiologist: Chong Mooney MD  Resident/CRNA: Nicci Singh APRN - CRNA  Performed by: Nicci Singh APRN - CRNA  Authorized by: Chong Mooney MD    Epidural  Patient position: sitting  Prep: ChloraPrep  Patient monitoring: continuous pulse ox and frequent blood pressure checks  Approach: midline  Location: L3-4  Injection technique: TERESA saline  Provider prep: sterile gloves and mask  Needle  Needle type: Tuohy   Needle gauge: 17 G  Needle length: 3.5 in  Needle insertion depth: 5 cm  Catheter type: multi-orifice  Catheter size: 20 G  Catheter at skin depth: 10 cm  Test dose: negativeCatheter Secured: tegaderm and tape  Assessment  Hemodynamics: stable  Attempts: 1  Outcomes: uncomplicated and patient tolerated procedure well  Preanesthetic Checklist  Completed: patient identified, IV checked, site marked, risks and benefits discussed, surgical/procedural consents, equipment checked, pre-op evaluation, timeout performed, anesthesia consent given, oxygen available, monitors applied/VS acknowledged, fire risk safety assessment completed and verbalized and blood product R/B/A discussed and consented

## 2024-02-16 NOTE — DISCHARGE SUMMARY
Obstetrical Discharge Summary     Name: Agustina Hubbard MRN: [id number]  SSN: [id number]    YOB: 1995  Age: 28 y.o.  Sex: female      Admit Date: 2/15/2024    Discharge Date: 24     Attending Physician:  Kristen Cronin MD     Delivering Physician:  Kristen Cronin MD     * Admission Diagnoses:   IUP @ 38w4d  IUGR      * Discharge Diagnoses:   Delivery of a VFI via  by Kristen Cronin MD on 2024.  Apgars were 9 and 9.    Same a above         Additional Diagnoses:      No results found for: \"RUBELLAEXT\", \"GRBSEXT\"   Immunization History   Administered Date(s) Administered    COVID-19, PFIZER PURPLE top, DILUTE for use, (age 12 y+), 30mcg/0.3mL 2021, 2021       * Procedures:            * Discharge Condition: good    * Hospital Course: Normal hospital course following the delivery.    * Disposition: Home    Discharge Medications:      Medication List        ASK your doctor about these medications      * albuterol 0.63 MG/3ML nebulizer solution  Commonly known as: ACCUNEB     * albuterol sulfate  (90 Base) MCG/ACT inhaler  Commonly known as: Ventolin HFA  Inhale 2 puffs into the lungs every 4 hours as needed for Wheezing or Shortness of Breath     aspirin 81 MG EC tablet     cetirizine 5 MG tablet  Commonly known as: ZYRTEC     famotidine 40 MG tablet  Commonly known as: PEPCID     montelukast 10 MG tablet  Commonly known as: SINGULAIR  Take 1 tablet by mouth daily     PRENATAL MULTI +DHA PO     valACYclovir 1 g tablet  Commonly known as: VALTREX           * This list has 2 medication(s) that are the same as other medications prescribed for you. Read the directions carefully, and ask your doctor or other care provider to review them with you.                  * Follow-up Care/Patient Instructions:  Activity: Activity as tolerated  Diet: Regular Diet  Wound Care: As directed  Followup 6 weeks for PP check        Signed By:  KRISTEN CRONIN MD     2024

## 2024-02-16 NOTE — ANESTHESIA PRE PROCEDURE
Department of Anesthesiology  Preprocedure Note       Name:  Agustina Hubbard   Age:  28 y.o.  :  1995                                          MRN:  [id number]         Date:  2024      Surgeon: * No surgeons listed *    Procedure: * No procedures listed *    Medications prior to admission:   Prior to Admission medications    Medication Sig Start Date End Date Taking? Authorizing Provider   cetirizine (ZYRTEC) 5 MG tablet Take 2 tablets by mouth daily   Yes Abby Schaefer MD   famotidine (PEPCID) 40 MG tablet Take 1 tablet by mouth daily   Yes Abby Schaefer MD   valACYclovir (VALTREX) 1 g tablet Take 1 tablet by mouth 2 times daily    Abby Schaefer MD   albuterol (ACCUNEB) 0.63 MG/3ML nebulizer solution Take 3 mLs by nebulization every 6 hours as needed for Wheezing    Abby Schaefer MD   albuterol sulfate HFA (VENTOLIN HFA) 108 (90 Base) MCG/ACT inhaler Inhale 2 puffs into the lungs every 4 hours as needed for Wheezing or Shortness of Breath 24   Bindu Harkins APRN - CNP   montelukast (SINGULAIR) 10 MG tablet Take 1 tablet by mouth daily 24   Bindu Harkins APRN - CNP   aspirin 81 MG EC tablet Take 1 tablet by mouth daily  Patient not taking: Reported on 2024    Abby Schaefer MD   Prenatal Vit-Fe Fum-FA-Omega (PRENATAL MULTI +DHA PO) Take by mouth    Abby Schaefer MD       Current medications:    Current Facility-Administered Medications   Medication Dose Route Frequency Provider Last Rate Last Admin    famotidine (PEPCID) tablet 40 mg  40 mg Oral Nightly Maren Freeman APRN - CNM        oxytocin (PITOCIN) 10 unit bolus from the bag  10 Units IntraVENous PRN Maren Freeman APRN - CNM        oxytocin (PITOCIN) 30 units in 500 mL infusion  87.3 tl-units/min IntraVENous Continuous PRN Maren Freeman APRN - CNM        miSOPROStol (CYTOTEC) tablet 800 mcg  800 mcg Rectal PRN Maren Freeman APRN - CNM        methylergonovine (METHERGINE) injection 200 mcg    Epidural PRN Nicci Singh, APRN - CRNA   3 mL at 02/16/24 0755    Lidocaine-EPINEPHrine 1.5 %-1:[id number]   Epidural PRN Nicci Singh, APRN - CRNA   2 mL at 02/16/24 0755       Allergies:    Allergies   Allergen Reactions    Pcn [Penicillins] Hives       Problem List:    Patient Active Problem List   Diagnosis Code    Hypertension I10    Asthma J45.909    Decreased fetal movements in third trimester, fetus 1 O36.8131    Encounter for induction of labor Z34.90       Past Medical History:        Diagnosis Date    Asthma     inhaler use    Hypertension     Gestational HTN w/ 1st pregnancy       Past Surgical History:        Procedure Laterality Date    US I&D BREAST ABSCESS DEEP  6/14/2019    US I&D BREAST ABSCESS DEEP    US I&D BREAST ABSCESS DEEP  5/27/2019    US I&D BREAST ABSCESS DEEP       Social History:    Social History     Tobacco Use    Smoking status: Never    Smokeless tobacco: Never   Substance Use Topics    Alcohol use: Not Currently                                Counseling given: Not Answered      Vital Signs (Current):   Vitals:    02/15/24 2147 02/15/24 2230 02/16/24 0030 02/16/24 0400   BP: 117/67  127/69 (!) 106/53   Pulse: (!) 116   76   Resp: 18  17 16   Temp: 36.9 °C (98.5 °F)  36.5 °C (97.7 °F) 36.7 °C (98.1 °F)   TempSrc:   Oral    SpO2: 98%  95% 93%   Weight:  77.6 kg (171 lb)     Height:  1.6 m (5' 3\")                                                BP Readings from Last 3 Encounters:   02/16/24 (!) 106/53   02/13/24 108/70   01/19/24 112/79       NPO Status:                                                                                 BMI:   Wt Readings from Last 3 Encounters:   02/15/24 77.6 kg (171 lb)   01/19/24 75.3 kg (166 lb)     Body mass index is 30.29 kg/m².    CBC:   Lab Results   Component Value Date/Time    WBC 11.5 02/15/2024 10:15 PM    RBC 3.93 02/15/2024 10:15 PM    HGB 10.9 02/15/2024 10:15 PM    HCT 31.5 02/15/2024 10:15 PM    MCV 80.2

## 2024-02-16 NOTE — PROGRESS NOTES
2100 Pt arrived to L&D for scheduled IOL.    2258 GUILLERMO Freeman CNM at the bedside to place cook catheter. Attempt unsuccessful. Pt tolerated well. POC discussed. Will proceed with cytotec and reevaluate need for cook in the morning. Pt agrees with plan.    0610 SROM.     0620 c/o 10/10 pain with ctx. Dilaudid given IV. Bolus initiated for epidural.

## 2024-02-16 NOTE — PROGRESS NOTES
Labor Note    Agustina Hubbard  [id number]  1995   38w4d      S:  Feeling comfortable now w pcea     O:    /63   Pulse 97   Temp 98 °F (36.7 °C) (Oral)   Resp 18   Ht 1.6 m (5' 3\")   Wt 77.6 kg (171 lb)   SpO2 93%   BMI 30.29 kg/m²        No data found.    GEN NAD   RESP Nonlabored, symmetric chest rise  SVE eferred   EXTREM Symmetric lower extremities     FHT cat 1    [place] q3, inverted      A/P:  28 y.o.  @ 38w4d - iol iugr   - GBS pos on ancef  / Rh pos  - FWB:  CEFM/[place]  - Labor course cytotec x 3, then water broke, was very uncomfortable, last cytotec 06:30, checked at that time 2-3 cm, discussed will re-evaluate and in a few hours and if progressing normally that is ok but if not may consider pit   - Pain control - pcea  - Anticipate .      Anne-Marie Cronin MD  [facility]

## 2024-02-16 NOTE — PROGRESS NOTES
0710 This RN received report; assumed care of the pt. Pt reports pain with contractions and wants an epidural. Pt is already bolusing for epidural. Pt denies any other needs or complaints at this time.     0740 Nicci GREER to bedside.     0744 Timeout completed.     0750 Test dose administered.     1105 Dr. Cronin informed of blood on chuk pad.     1110 Dr. Muhammad to bedside. Pt consent to SVE; 6 cm, 80%, -3. Pt also consents to AROM at this time; clear fluid.    1305 Patient actively pushing.  RN remains in continuous attendance at the bedside.  Assessment & evaluation of fetal heart rate ongoing via continuous EFM.     1310 RN remained at bedside throughout pushing.  EFM continuously assessed.  Vaginal delivery of viable infant.

## 2024-02-17 VITALS
TEMPERATURE: 98.6 F | DIASTOLIC BLOOD PRESSURE: 62 MMHG | HEIGHT: 63 IN | WEIGHT: 171 LBS | HEART RATE: 78 BPM | OXYGEN SATURATION: 99 % | SYSTOLIC BLOOD PRESSURE: 125 MMHG | RESPIRATION RATE: 16 BRPM | BODY MASS INDEX: 30.3 KG/M2

## 2024-02-17 PROBLEM — O36.8131: Status: RESOLVED | Noted: 2024-01-17 | Resolved: 2024-02-17

## 2024-02-17 PROBLEM — Z34.90 ENCOUNTER FOR INDUCTION OF LABOR: Status: RESOLVED | Noted: 2024-02-15 | Resolved: 2024-02-17

## 2024-02-17 PROBLEM — I10 HYPERTENSION: Status: RESOLVED | Noted: 2024-01-16 | Resolved: 2024-02-17

## 2024-02-17 PROCEDURE — 6370000000 HC RX 637 (ALT 250 FOR IP): Performed by: ADVANCED PRACTICE MIDWIFE

## 2024-02-17 PROCEDURE — 6370000000 HC RX 637 (ALT 250 FOR IP): Performed by: STUDENT IN AN ORGANIZED HEALTH CARE EDUCATION/TRAINING PROGRAM

## 2024-02-17 RX ADMIN — ACETAMINOPHEN 1000 MG: 500 TABLET ORAL at 06:31

## 2024-02-17 RX ADMIN — IBUPROFEN 800 MG: 800 TABLET ORAL at 02:20

## 2024-02-17 RX ADMIN — MONTELUKAST 10 MG: 10 TABLET, FILM COATED ORAL at 06:29

## 2024-02-17 RX ADMIN — DOCUSATE SODIUM 100 MG: 100 CAPSULE, LIQUID FILLED ORAL at 10:33

## 2024-02-17 RX ADMIN — CETIRIZINE HYDROCHLORIDE 10 MG: 10 TABLET, FILM COATED ORAL at 06:29

## 2024-02-17 RX ADMIN — IBUPROFEN 800 MG: 800 TABLET ORAL at 10:33

## 2024-02-17 ASSESSMENT — PAIN DESCRIPTION - DESCRIPTORS
DESCRIPTORS: CRAMPING
DESCRIPTORS: ACHING;CRAMPING
DESCRIPTORS: CRAMPING

## 2024-02-17 ASSESSMENT — PAIN DESCRIPTION - ORIENTATION
ORIENTATION: LOWER

## 2024-02-17 ASSESSMENT — PAIN SCALES - GENERAL
PAINLEVEL_OUTOF10: 3
PAINLEVEL_OUTOF10: 3
PAINLEVEL_OUTOF10: 5

## 2024-02-17 ASSESSMENT — PAIN DESCRIPTION - LOCATION
LOCATION: BACK
LOCATION: ABDOMEN
LOCATION: ABDOMEN

## 2024-02-17 NOTE — DISCHARGE INSTRUCTIONS
Discharge Instructions for Vaginal Delivery    Patient ID:  Agustina Hubbard  [id number]  28 y.o.  1995    Take Home Medications     Continue taking your prenatal vitamins if you are breastfeeding.    Follow-up care is a key part of your treatment and safety. Please schedule and keep appointments.  Follow-up with your primary OB in 4-6 weeks.    Activity  Avoid anything in your vagina for 6 weeks (no intercourse, tampons, or douching).  You may drive unless you are taking prescription pain medications.  Climbing stairs and light lifting are okay.  Please avoid excessive exercise, though walking is okay- you'll be tired!    Diet  Regular diet as tolerated.  Be sure to drink plenty of fluids if you are breastfeeding.    Wound care  If you have stitches, continue to rinse with a squirt bottle of warm water each time you void for about 7-10 days. Your stitches will gradually dissolve over four to eight weeks.  Sitz baths are also helpful to keep the wound clean, encourage healing, and to help with pain associated with the stitches or hemorrhoids.  You can use either a sitz bath basin or a bathtub filled with 2-3 inches of plain warm water.  Soak for 10 minutes 3 times a day as tolerated.    Pain Management  Over the counter medications such as Tylenol and ibuprofen (Motrin or Advil) are ideal.  These may be taken together, alternating doses.  You may  take the maximum dose:  Motrin or Advil (generic ibuprofen), either 3 tablets every 6 hours or 4 tablets every 8 hours or Tylenol (acetominophen) 1000mg every 6 hours (equivalent to 2 extra strength Tylenol).  Add heating pad or sitz baths as needed. Add hemorrhoid wipes or ointments if needed    Constipation  Constipation is normal after pregnancy and delivery, especially while taking prescription narcotic pain medication.  Over the counter remedies including ducosate (Colace), take 1-2 capsules 1-2 times daily for soft stool as needed.  You may also add/ try milk of

## 2024-02-17 NOTE — PROGRESS NOTES
Lm for patient to return call Post-Partum Vaginal Delivery Note    Agustina Hubbard  [id number]  1995  28 y.o.    S:  Ms. Agustina Hubbard is a 28 y.o.  PPD #1 s/p  @ 38w4d.  Doing well.  She had a baby girl .  Her lochia is like a period.  She describes her pain as mild and is well controlled with PO medications.  She is breast feeding and supplementing with formula feeds.  She is ambulating and voiding.  Tolerating PO intake.      O:   /76   Pulse 64   Temp 98.1 °F (36.7 °C) (Oral)   Resp 16   Ht 1.6 m (5' 3\")   Wt 77.6 kg (171 lb)   SpO2 98%   Breastfeeding Unknown   BMI 30.29 kg/m²     Gen - No acute distress  Respirations - nonlabored   Abdomen - Fundus firm below the umbilicus   Ext - Warm, well perfused, nontender     Lab Results   Component Value Date/Time    WBC 11.5 02/15/2024 10:15 PM    HGB 10.9 02/15/2024 10:15 PM    HCT 31.5 02/15/2024 10:15 PM     02/15/2024 10:15 PM    MCV 80.2 02/15/2024 10:15 PM         A/P:  28 y.o.  PPD #1 s/p  @ 38w4d doing well.    1.  Routine PP instructions/ care discussed  2.  Blood type - Rh pos  3.  Rubella immune  4.  Circumcision n/a   5.  Discharge PPD1   6.  F/U 4-6 weeks for PP check.      Corinna Gavin MD  [facility]

## 2024-02-18 LAB — T PALLIDUM AB SER QL IA: NON REACTIVE

## 2024-03-01 NOTE — ED ADULT TRIAGE NOTE - BSA (M2)
1.69 Render Risk Assessment In Note?: no Detail Level: Simple Additional Notes: dime-sized patch which occurred after surgery for papillary thyroid CA, stable

## 2024-03-11 RX ORDER — ALBUTEROL SULFATE 90 UG/1
AEROSOL, METERED RESPIRATORY (INHALATION)
Qty: 18 EACH | OUTPATIENT
Start: 2024-03-11

## 2024-03-27 NOTE — ANESTHESIA POSTPROCEDURE EVALUATION
Department of Anesthesiology  Postprocedure Note    Patient: Agustina Hubbard  MRN: [id number]  YOB: 1995  Date of evaluation: 3/27/2024    Procedure Summary       Date: 02/16/24 Room / Location:     Anesthesia Start: 0742 Anesthesia Stop: 1310    Procedure: Labor Analgesia Diagnosis:     Scheduled Providers:  Responsible Provider: Chong Mooney MD    Anesthesia Type: epidural ASA Status: 3            Anesthesia Type: No value filed.    Pardeep Phase I:      Pardeep Phase II:      Anesthesia Post Evaluation    Patient location during evaluation: bedside  Patient participation: complete - patient participated  Level of consciousness: awake and alert  Pain score: 1  Airway patency: patent  Nausea & Vomiting: no nausea and no vomiting  Cardiovascular status: blood pressure returned to baseline  Respiratory status: acceptable  Hydration status: euvolemic  Pain management: satisfactory to patient    No notable events documented.

## 2024-06-26 RX ORDER — MONTELUKAST SODIUM 10 MG/1
10 TABLET ORAL DAILY
Qty: 30 TABLET | Refills: 3 | OUTPATIENT
Start: 2024-06-26

## 2025-08-19 ENCOUNTER — OFFICE VISIT (OUTPATIENT)
Age: 30
End: 2025-08-19
Payer: COMMERCIAL

## 2025-08-19 VITALS
BODY MASS INDEX: 28.99 KG/M2 | RESPIRATION RATE: 18 BRPM | SYSTOLIC BLOOD PRESSURE: 122 MMHG | WEIGHT: 163.6 LBS | HEART RATE: 87 BPM | DIASTOLIC BLOOD PRESSURE: 80 MMHG | TEMPERATURE: 98.7 F | OXYGEN SATURATION: 99 % | HEIGHT: 63 IN

## 2025-08-19 DIAGNOSIS — J45.20 MILD INTERMITTENT ASTHMA WITHOUT COMPLICATION: ICD-10-CM

## 2025-08-19 DIAGNOSIS — E66.3 OVERWEIGHT (BMI 25.0-29.9): ICD-10-CM

## 2025-08-19 DIAGNOSIS — A60.04 HERPES SIMPLEX VULVOVAGINITIS: ICD-10-CM

## 2025-08-19 DIAGNOSIS — Z76.89 ENCOUNTER TO ESTABLISH CARE: Primary | ICD-10-CM

## 2025-08-19 PROCEDURE — 99204 OFFICE O/P NEW MOD 45 MIN: CPT

## 2025-08-19 RX ORDER — VALACYCLOVIR HYDROCHLORIDE 500 MG/1
500 TABLET, FILM COATED ORAL DAILY
Qty: 90 TABLET | Refills: 1 | Status: SHIPPED | OUTPATIENT
Start: 2025-08-19

## 2025-08-19 RX ORDER — BUDESONIDE AND FORMOTEROL FUMARATE DIHYDRATE 160; 4.5 UG/1; UG/1
2 AEROSOL RESPIRATORY (INHALATION) 2 TIMES DAILY
COMMUNITY
Start: 2025-06-20

## 2025-08-19 RX ORDER — VALACYCLOVIR HYDROCHLORIDE 500 MG/1
500 TABLET, FILM COATED ORAL DAILY
Qty: 90 TABLET | Refills: 0 | Status: SHIPPED | OUTPATIENT
Start: 2025-08-19 | End: 2025-08-19

## 2025-08-19 SDOH — ECONOMIC STABILITY: FOOD INSECURITY: WITHIN THE PAST 12 MONTHS, THE FOOD YOU BOUGHT JUST DIDN'T LAST AND YOU DIDN'T HAVE MONEY TO GET MORE.: NEVER TRUE

## 2025-08-19 SDOH — ECONOMIC STABILITY: FOOD INSECURITY: WITHIN THE PAST 12 MONTHS, YOU WORRIED THAT YOUR FOOD WOULD RUN OUT BEFORE YOU GOT MONEY TO BUY MORE.: NEVER TRUE

## 2025-08-19 ASSESSMENT — PATIENT HEALTH QUESTIONNAIRE - PHQ9
SUM OF ALL RESPONSES TO PHQ QUESTIONS 1-9: 0
2. FEELING DOWN, DEPRESSED OR HOPELESS: NOT AT ALL
1. LITTLE INTEREST OR PLEASURE IN DOING THINGS: NOT AT ALL

## 2025-08-19 ASSESSMENT — LIFESTYLE VARIABLES: HOW OFTEN DO YOU HAVE A DRINK CONTAINING ALCOHOL: 4 OR MORE TIMES A WEEK

## 2025-08-20 LAB
ALBUMIN SERPL-MCNC: 4.4 G/DL (ref 3.5–5.2)
ALBUMIN/GLOB SERPL: 1.7 (ref 1.1–2.2)
ALP SERPL-CCNC: 101 U/L (ref 35–104)
ALT SERPL-CCNC: 25 U/L (ref 10–35)
ANION GAP SERPL CALC-SCNC: 12 MMOL/L (ref 2–14)
AST SERPL-CCNC: 22 U/L (ref 10–35)
BILIRUB SERPL-MCNC: 0.5 MG/DL (ref 0–1.2)
BUN SERPL-MCNC: 7 MG/DL (ref 6–20)
BUN/CREAT SERPL: 12 (ref 12–20)
CALCIUM SERPL-MCNC: 9.5 MG/DL (ref 8.6–10)
CHLORIDE SERPL-SCNC: 105 MMOL/L (ref 98–107)
CHOLEST SERPL-MCNC: 140 MG/DL (ref 0–200)
CO2 SERPL-SCNC: 23 MMOL/L (ref 20–29)
COMMENT:: NORMAL
CREAT SERPL-MCNC: 0.61 MG/DL (ref 0.6–1)
ERYTHROCYTE [DISTWIDTH] IN BLOOD BY AUTOMATED COUNT: 12.3 % (ref 11.5–14.5)
EST. AVERAGE GLUCOSE BLD GHB EST-MCNC: 104 MG/DL
GLOBULIN SER CALC-MCNC: 2.7 G/DL (ref 2–4)
GLUCOSE SERPL-MCNC: 99 MG/DL (ref 65–100)
HBA1C MFR BLD: 5.2 % (ref 4–5.6)
HCT VFR BLD AUTO: 39.3 % (ref 35–47)
HDLC SERPL-MCNC: 50 MG/DL (ref 40–60)
HDLC SERPL: 2.8 (ref 0–5)
HGB BLD-MCNC: 13.2 G/DL (ref 11.5–16)
LDLC SERPL CALC-MCNC: 71 MG/DL (ref 0–100)
MCH RBC QN AUTO: 29.3 PG (ref 26–34)
MCHC RBC AUTO-ENTMCNC: 33.6 G/DL (ref 30–36.5)
MCV RBC AUTO: 87.1 FL (ref 80–99)
NRBC # BLD: 0 K/UL (ref 0–0.01)
NRBC BLD-RTO: 0 PER 100 WBC
PLATELET # BLD AUTO: 321 K/UL (ref 150–400)
PMV BLD AUTO: 10.8 FL (ref 8.9–12.9)
POTASSIUM SERPL-SCNC: 4.4 MMOL/L (ref 3.5–5.1)
PROT SERPL-MCNC: 7.1 G/DL (ref 6.4–8.3)
RBC # BLD AUTO: 4.51 M/UL (ref 3.8–5.2)
SODIUM SERPL-SCNC: 139 MMOL/L (ref 136–145)
SPECIMEN HOLD: NORMAL
TRIGL SERPL-MCNC: 97 MG/DL (ref 0–150)
VLDLC SERPL CALC-MCNC: 19 MG/DL
WBC # BLD AUTO: 9.1 K/UL (ref 3.6–11)